# Patient Record
Sex: MALE | Race: BLACK OR AFRICAN AMERICAN | Employment: FULL TIME | URBAN - METROPOLITAN AREA
[De-identification: names, ages, dates, MRNs, and addresses within clinical notes are randomized per-mention and may not be internally consistent; named-entity substitution may affect disease eponyms.]

---

## 2020-11-02 ENCOUNTER — HOSPITAL ENCOUNTER (EMERGENCY)
Age: 35
Discharge: HOME | End: 2020-11-02
Attending: EMERGENCY MEDICINE
Payer: COMMERCIAL

## 2020-11-02 VITALS
SYSTOLIC BLOOD PRESSURE: 151 MMHG | BODY MASS INDEX: 28.04 KG/M2 | DIASTOLIC BLOOD PRESSURE: 102 MMHG | HEIGHT: 68 IN | OXYGEN SATURATION: 97 % | TEMPERATURE: 97.1 F | HEART RATE: 88 BPM | WEIGHT: 185 LBS | RESPIRATION RATE: 18 BRPM

## 2020-11-02 PROCEDURE — 99283 EMERGENCY DEPT VISIT LOW MDM: CPT

## 2020-11-02 RX ORDER — ALBUTEROL SULFATE 90 UG/1
2 AEROSOL, METERED RESPIRATORY (INHALATION) EVERY 6 HOURS PRN
Qty: 1 INHALER | Refills: 0 | Status: SHIPPED | OUTPATIENT
Start: 2020-11-02 | End: 2020-11-09

## 2020-11-02 RX ORDER — PREDNISONE 50 MG/1
50 TABLET ORAL DAILY
Qty: 5 TABLET | Refills: 0 | Status: ON HOLD | OUTPATIENT
Start: 2020-11-02 | End: 2020-11-10 | Stop reason: HOSPADM

## 2020-11-02 ASSESSMENT — ENCOUNTER SYMPTOMS
NAUSEA: 0
SINUS PRESSURE: 0
SORE THROAT: 0
CHEST TIGHTNESS: 0
BACK PAIN: 0
DIARRHEA: 0
VOMITING: 0
ABDOMINAL PAIN: 0
RHINORRHEA: 1
COUGH: 1
SHORTNESS OF BREATH: 0
WHEEZING: 0

## 2020-11-02 ASSESSMENT — PAIN DESCRIPTION - FREQUENCY: FREQUENCY: CONTINUOUS

## 2020-11-02 ASSESSMENT — PAIN DESCRIPTION - LOCATION: LOCATION: GENERALIZED

## 2020-11-02 ASSESSMENT — PAIN DESCRIPTION - PAIN TYPE: TYPE: ACUTE PAIN

## 2020-11-02 ASSESSMENT — PAIN DESCRIPTION - DESCRIPTORS: DESCRIPTORS: ACHING

## 2020-11-02 NOTE — ED PROVIDER NOTES
Chief complaint:  Covid    HPI history provided by the patient  Patient comes in complaining of Covid positive. He has had symptoms for about 5 days including upper respiratory symptoms with cough and congestion with body aches, fevers, chills and sweats. Loss of appetite but no vomiting or diarrhea. States he is not eating as much but still drinking well and staying hydrated. Get some headaches with it. No stiff neck. No confusion. No abdominal pain or flank pain. No rashes. No difficulty breathing. Has tried some intermittent NyQuil with mild improvement. Nothing really makes them better otherwise, he does feel fatigued with exertion which makes him worse. Review of Systems   Constitutional: Positive for activity change, appetite change, chills, diaphoresis, fatigue and fever. HENT: Positive for congestion and rhinorrhea. Negative for ear pain, sinus pressure and sore throat. Respiratory: Positive for cough. Negative for chest tightness, shortness of breath and wheezing. Cardiovascular: Negative for chest pain and palpitations. Gastrointestinal: Negative for abdominal pain, diarrhea, nausea and vomiting. Genitourinary: Negative for dysuria, flank pain, frequency, genital sores and urgency. Musculoskeletal: Positive for arthralgias and myalgias. Negative for back pain, gait problem, joint swelling, neck pain and neck stiffness. Skin: Negative for rash and wound. Neurological: Positive for headaches. Negative for dizziness, seizures, syncope, weakness, light-headedness and numbness. Hematological: Negative for adenopathy. All other systems reviewed and are negative. Physical Exam  Vitals signs and nursing note reviewed. Constitutional:       General: He is not in acute distress. Appearance: He is well-developed. He is diaphoretic. He is not ill-appearing or toxic-appearing. Comments: Patient sitting up in the bed smiling, laughing and talking pleasantly.   He is mildly diaphoretic. He otherwise appears well. HENT:      Head: Normocephalic and atraumatic. Right Ear: Tympanic membrane, ear canal and external ear normal.      Left Ear: Tympanic membrane, ear canal and external ear normal.      Nose: Mucosal edema and congestion present. No rhinorrhea. Mouth/Throat:      Pharynx: Oropharynx is clear. Uvula midline. No pharyngeal swelling, oropharyngeal exudate, posterior oropharyngeal erythema or uvula swelling. Tonsils: No tonsillar exudate or tonsillar abscesses. Eyes:      Pupils: Pupils are equal, round, and reactive to light. Neck:      Musculoskeletal: Normal range of motion and neck supple. Normal range of motion. No neck rigidity, injury, pain with movement, spinous process tenderness or muscular tenderness. Trachea: Trachea and phonation normal. No tracheal tenderness or tracheal deviation. Comments: No adenopathy or meningeal signs  Cardiovascular:      Rate and Rhythm: Normal rate and regular rhythm. Heart sounds: Normal heart sounds. No murmur. Pulmonary:      Effort: Pulmonary effort is normal. No respiratory distress. Breath sounds: Normal breath sounds. No stridor, decreased air movement or transmitted upper airway sounds. No decreased breath sounds, wheezing, rhonchi or rales. Chest:      Chest wall: No tenderness. Abdominal:      General: Bowel sounds are normal. There is no distension. Palpations: Abdomen is soft. Tenderness: There is no abdominal tenderness. There is no right CVA tenderness, left CVA tenderness, guarding or rebound. Musculoskeletal:         General: No swelling, tenderness, deformity or signs of injury. Right lower leg: No edema. Left lower leg: No edema. Comments: No pretibial edema or calf pain   Lymphadenopathy:      Cervical: No cervical adenopathy. Skin:     General: Skin is warm. Coloration: Skin is not jaundiced or pale.       Findings: No erythema or rash.   Neurological:      General: No focal deficit present. Mental Status: He is alert and oriented to person, place, and time. GCS: GCS eye subscore is 4. GCS verbal subscore is 5. GCS motor subscore is 6. Cranial Nerves: Cranial nerves are intact. No cranial nerve deficit. Sensory: Sensation is intact. Motor: Motor function is intact. Coordination: Coordination is intact. Coordination normal.   Psychiatric:         Behavior: Behavior is cooperative. Procedures     MDM              --------------------------------------------- PAST HISTORY ---------------------------------------------  Past Medical History:  has no past medical history on file. Past Surgical History:  has no past surgical history on file. Social History:      Family History: family history is not on file. The patients home medications have been reviewed. Allergies: Patient has no known allergies. -------------------------------------------------- RESULTS -------------------------------------------------  Labs:  No results found for this visit on 11/02/20. Radiology:  No orders to display       ------------------------- NURSING NOTES AND VITALS REVIEWED ---------------------------  Date / Time Roomed:  11/2/2020  8:02 AM  ED Bed Assignment:  07/07    The nursing notes within the ED encounter and vital signs as below have been reviewed. BP (!) 151/102   Pulse 88   Temp 97.1 °F (36.2 °C) (Temporal)   Resp 18   Ht 5' 8\" (1.727 m)   Wt 185 lb (83.9 kg)   SpO2 97%   BMI 28.13 kg/m²   Oxygen Saturation Interpretation: Normal      ------------------------------------------ PROGRESS NOTES ------------------------------------------  I have spoken with the patient and discussed todays results, in addition to providing specific details for the plan of care and counseling regarding the diagnosis and prognosis. Their questions are answered at this time and they are agreeable with the plan.  I discussed at length with them reasons for immediate return here for re evaluation. They will followup with primary care by calling their office tomorrow. --------------------------------- ADDITIONAL PROVIDER NOTES ---------------------------------  At this time the patient is without objective evidence of an acute process requiring hospitalization or inpatient management. They have remained hemodynamically stable throughout their entire ED visit and are stable for discharge with outpatient follow-up. The plan has been discussed in detail and they are aware of the specific conditions for emergent return, as well as the importance of follow-up. New Prescriptions    ALBUTEROL SULFATE HFA (PROAIR HFA) 108 (90 BASE) MCG/ACT INHALER    Inhale 2 puffs into the lungs every 6 hours as needed for Wheezing    PREDNISONE (DELTASONE) 50 MG TABLET    Take 1 tablet by mouth daily for 5 days       Diagnosis:  1. COVID-19        Disposition:  Patient's disposition: Discharge to home  Patient's condition is stable.               Lobo Garvin DO  11/02/20 8332

## 2020-11-03 ENCOUNTER — CARE COORDINATION (OUTPATIENT)
Dept: CASE MANAGEMENT | Age: 35
End: 2020-11-03

## 2020-11-03 NOTE — CARE COORDINATION
Covid-19 Initial Outreach call, no answer.   Left VM with contact information and request  for return call at [address] [facility], [address] [facility] Coordination Transition

## 2020-11-04 ENCOUNTER — CARE COORDINATION (OUTPATIENT)
Dept: CASE MANAGEMENT | Age: 35
End: 2020-11-04

## 2020-11-04 NOTE — CARE COORDINATION
Covid-19 2nd Outreach call, no answer.    H number no longer valid,  Left VM on cell with contact information and request  for return call at [address] [facility], [address] [place] [address] Coordination Transition

## 2020-11-05 ENCOUNTER — HOSPITAL ENCOUNTER (INPATIENT)
Age: 35
LOS: 5 days | Discharge: HOME | DRG: 871 | End: 2020-11-10
Attending: EMERGENCY MEDICINE | Admitting: INTERNAL MEDICINE
Payer: COMMERCIAL

## 2020-11-05 ENCOUNTER — APPOINTMENT (OUTPATIENT)
Dept: CT IMAGING | Age: 35
DRG: 871 | End: 2020-11-05
Payer: COMMERCIAL

## 2020-11-05 PROBLEM — R04.2 HEMOPTYSIS: Status: ACTIVE | Noted: 2020-11-05

## 2020-11-05 PROBLEM — R06.89 DYSPNEA AND RESPIRATORY ABNORMALITIES: Status: ACTIVE | Noted: 2020-11-05

## 2020-11-05 PROBLEM — U07.1 COVID-19 VIRUS INFECTION: Status: ACTIVE | Noted: 2020-11-05

## 2020-11-05 PROBLEM — R06.00 DYSPNEA AND RESPIRATORY ABNORMALITIES: Status: ACTIVE | Noted: 2020-11-05

## 2020-11-05 PROBLEM — U07.1 COVID-19: Status: ACTIVE | Noted: 2020-11-05

## 2020-11-05 LAB
ALBUMIN SERPL-MCNC: 3.9 G/DL (ref 3.5–5.2)
ALP BLD-CCNC: 56 U/L (ref 40–129)
ALT SERPL-CCNC: 62 U/L (ref 0–40)
AMYLASE: 103 U/L (ref 20–100)
APTT: 25.9 SEC (ref 24.5–35.1)
AST SERPL-CCNC: 34 U/L (ref 0–39)
BILIRUB SERPL-MCNC: 0.7 MG/DL (ref 0–1.2)
BILIRUBIN DIRECT: 0.3 MG/DL (ref 0–0.3)
BILIRUBIN, INDIRECT: 0.4 MG/DL (ref 0–1)
CK MB: <1 NG/ML (ref 0–7.7)
D DIMER: 475 NG/ML DDU
HCT VFR BLD CALC: 40.7 % (ref 37–54)
HEMOGLOBIN: 14 G/DL (ref 12.5–16.5)
INR BLD: 1.3
LACTIC ACID: 1.4 MMOL/L (ref 0.5–2.2)
LIPASE: 48 U/L (ref 13–60)
MAGNESIUM: 2.4 MG/DL (ref 1.6–2.6)
MCH RBC QN AUTO: 28.8 PG (ref 26–35)
MCHC RBC AUTO-ENTMCNC: 34.4 % (ref 32–34.5)
MCV RBC AUTO: 83.7 FL (ref 80–99.9)
PDW BLD-RTO: 12.2 FL (ref 11.5–15)
PLATELET # BLD: 256 E9/L (ref 130–450)
PMV BLD AUTO: 9.8 FL (ref 7–12)
PRO-BNP: <5 PG/ML (ref 0–125)
PROTHROMBIN TIME: 14.9 SEC (ref 9.3–12.4)
RBC # BLD: 4.86 E12/L (ref 3.8–5.8)
TOTAL CK: 67 U/L (ref 20–200)
TOTAL PROTEIN: 8.9 G/DL (ref 6.4–8.3)
WBC # BLD: 6.5 E9/L (ref 4.5–11.5)

## 2020-11-05 PROCEDURE — 80048 BASIC METABOLIC PNL TOTAL CA: CPT

## 2020-11-05 PROCEDURE — 6370000000 HC RX 637 (ALT 250 FOR IP): Performed by: EMERGENCY MEDICINE

## 2020-11-05 PROCEDURE — 83735 ASSAY OF MAGNESIUM: CPT

## 2020-11-05 PROCEDURE — 83690 ASSAY OF LIPASE: CPT

## 2020-11-05 PROCEDURE — 71275 CT ANGIOGRAPHY CHEST: CPT

## 2020-11-05 PROCEDURE — 83880 ASSAY OF NATRIURETIC PEPTIDE: CPT

## 2020-11-05 PROCEDURE — 80076 HEPATIC FUNCTION PANEL: CPT

## 2020-11-05 PROCEDURE — 85610 PROTHROMBIN TIME: CPT

## 2020-11-05 PROCEDURE — 6360000004 HC RX CONTRAST MEDICATION: Performed by: RADIOLOGY

## 2020-11-05 PROCEDURE — 36415 COLL VENOUS BLD VENIPUNCTURE: CPT

## 2020-11-05 PROCEDURE — 1200000000 HC SEMI PRIVATE

## 2020-11-05 PROCEDURE — 96374 THER/PROPH/DIAG INJ IV PUSH: CPT

## 2020-11-05 PROCEDURE — 85730 THROMBOPLASTIN TIME PARTIAL: CPT

## 2020-11-05 PROCEDURE — 87040 BLOOD CULTURE FOR BACTERIA: CPT

## 2020-11-05 PROCEDURE — 85027 COMPLETE CBC AUTOMATED: CPT

## 2020-11-05 PROCEDURE — 93005 ELECTROCARDIOGRAM TRACING: CPT | Performed by: EMERGENCY MEDICINE

## 2020-11-05 PROCEDURE — 87205 SMEAR GRAM STAIN: CPT

## 2020-11-05 PROCEDURE — 83605 ASSAY OF LACTIC ACID: CPT

## 2020-11-05 PROCEDURE — 82550 ASSAY OF CK (CPK): CPT

## 2020-11-05 PROCEDURE — 82553 CREATINE MB FRACTION: CPT

## 2020-11-05 PROCEDURE — 2580000003 HC RX 258: Performed by: EMERGENCY MEDICINE

## 2020-11-05 PROCEDURE — 87070 CULTURE OTHR SPECIMN AEROBIC: CPT

## 2020-11-05 PROCEDURE — 85378 FIBRIN DEGRADE SEMIQUANT: CPT

## 2020-11-05 PROCEDURE — 82150 ASSAY OF AMYLASE: CPT

## 2020-11-05 PROCEDURE — 99283 EMERGENCY DEPT VISIT LOW MDM: CPT

## 2020-11-05 PROCEDURE — 6360000002 HC RX W HCPCS: Performed by: EMERGENCY MEDICINE

## 2020-11-05 RX ORDER — ALBUTEROL SULFATE 90 UG/1
1 AEROSOL, METERED RESPIRATORY (INHALATION) ONCE
Status: COMPLETED | OUTPATIENT
Start: 2020-11-05 | End: 2020-11-05

## 2020-11-05 RX ORDER — ALBUTEROL SULFATE 90 UG/1
2 AEROSOL, METERED RESPIRATORY (INHALATION) 4 TIMES DAILY
Status: DISCONTINUED | OUTPATIENT
Start: 2020-11-05 | End: 2020-11-05

## 2020-11-05 RX ORDER — 0.9 % SODIUM CHLORIDE 0.9 %
1000 INTRAVENOUS SOLUTION INTRAVENOUS ONCE
Status: COMPLETED | OUTPATIENT
Start: 2020-11-05 | End: 2020-11-05

## 2020-11-05 RX ORDER — IPRATROPIUM BROMIDE AND ALBUTEROL SULFATE 2.5; .5 MG/3ML; MG/3ML
1 SOLUTION RESPIRATORY (INHALATION) ONCE
Status: DISCONTINUED | OUTPATIENT
Start: 2020-11-05 | End: 2020-11-05

## 2020-11-05 RX ADMIN — SODIUM CHLORIDE 1000 ML: 9 INJECTION, SOLUTION INTRAVENOUS at 21:46

## 2020-11-05 RX ADMIN — ALBUTEROL SULFATE 1 PUFF: 90 AEROSOL, METERED RESPIRATORY (INHALATION) at 23:01

## 2020-11-05 RX ADMIN — IOPAMIDOL 75 ML: 755 INJECTION, SOLUTION INTRAVENOUS at 21:31

## 2020-11-05 RX ADMIN — CEFEPIME HYDROCHLORIDE 2 G: 2 INJECTION, POWDER, FOR SOLUTION INTRAVENOUS at 23:17

## 2020-11-05 RX ADMIN — AZITHROMYCIN 500 MG: 500 INJECTION, POWDER, LYOPHILIZED, FOR SOLUTION INTRAVENOUS at 21:46

## 2020-11-05 ASSESSMENT — ENCOUNTER SYMPTOMS
EYE PAIN: 0
COUGH: 1
SORE THROAT: 0
VOMITING: 0
SHORTNESS OF BREATH: 1
CHEST TIGHTNESS: 1
DIARRHEA: 0
ABDOMINAL PAIN: 0
EYE REDNESS: 0
NAUSEA: 0
SINUS PRESSURE: 0
EYE DISCHARGE: 0
WHEEZING: 0
BACK PAIN: 0

## 2020-11-06 LAB
ABO/RH: NORMAL
ADENOVIRUS BY PCR: NOT DETECTED
ALBUMIN SERPL-MCNC: 3.8 G/DL (ref 3.5–5.2)
ALP BLD-CCNC: 44 U/L (ref 40–129)
ALT SERPL-CCNC: 45 U/L (ref 0–40)
ANION GAP SERPL CALCULATED.3IONS-SCNC: 16 MMOL/L (ref 7–16)
ANION GAP SERPL CALCULATED.3IONS-SCNC: 9 MMOL/L (ref 7–16)
ANTIBODY SCREEN: NORMAL
APTT: 26.3 SEC (ref 24.5–35.1)
APTT: 28.3 SEC (ref 24.5–35.1)
AST SERPL-CCNC: 23 U/L (ref 0–39)
BASOPHILS ABSOLUTE: 0 E9/L (ref 0–0.2)
BASOPHILS RELATIVE PERCENT: 0.2 % (ref 0–2)
BILIRUB SERPL-MCNC: 0.5 MG/DL (ref 0–1.2)
BORDETELLA PARAPERTUSSIS BY PCR: NOT DETECTED
BORDETELLA PERTUSSIS BY PCR: NOT DETECTED
BUN BLDV-MCNC: 14 MG/DL (ref 6–20)
BUN BLDV-MCNC: 16 MG/DL (ref 6–20)
C-REACTIVE PROTEIN: 7 MG/DL (ref 0–0.4)
CALCIUM SERPL-MCNC: 8.6 MG/DL (ref 8.6–10.2)
CALCIUM SERPL-MCNC: 9.1 MG/DL (ref 8.6–10.2)
CHLAMYDOPHILIA PNEUMONIAE BY PCR: NOT DETECTED
CHLORIDE BLD-SCNC: 103 MMOL/L (ref 98–107)
CHLORIDE BLD-SCNC: 97 MMOL/L (ref 98–107)
CO2: 25 MMOL/L (ref 22–29)
CO2: 27 MMOL/L (ref 22–29)
CORONAVIRUS 229E BY PCR: NOT DETECTED
CORONAVIRUS HKU1 BY PCR: NOT DETECTED
CORONAVIRUS NL63 BY PCR: NOT DETECTED
CORONAVIRUS OC43 BY PCR: NOT DETECTED
CREAT SERPL-MCNC: 1.1 MG/DL (ref 0.7–1.2)
CREAT SERPL-MCNC: 1.3 MG/DL (ref 0.7–1.2)
D DIMER: 424 NG/ML DDU
EKG ATRIAL RATE: 100 BPM
EKG P AXIS: 31 DEGREES
EKG P-R INTERVAL: 128 MS
EKG Q-T INTERVAL: 340 MS
EKG QRS DURATION: 80 MS
EKG QTC CALCULATION (BAZETT): 438 MS
EKG R AXIS: 107 DEGREES
EKG T AXIS: 25 DEGREES
EKG VENTRICULAR RATE: 100 BPM
EOSINOPHILS ABSOLUTE: 0 E9/L (ref 0.05–0.5)
EOSINOPHILS RELATIVE PERCENT: 0 % (ref 0–6)
FERRITIN: 709 NG/ML
FIBRINOGEN: >700 MG/DL (ref 225–540)
FIBRINOGEN: >700 MG/DL (ref 225–540)
GFR AFRICAN AMERICAN: >60
GFR AFRICAN AMERICAN: >60
GFR NON-AFRICAN AMERICAN: >60 ML/MIN/1.73
GFR NON-AFRICAN AMERICAN: >60 ML/MIN/1.73
GLUCOSE BLD-MCNC: 137 MG/DL (ref 74–99)
GLUCOSE BLD-MCNC: 173 MG/DL (ref 74–99)
HBA1C MFR BLD: 5.6 % (ref 4–5.6)
HCT VFR BLD CALC: 43 % (ref 37–54)
HEMOGLOBIN: 14.5 G/DL (ref 12.5–16.5)
HUMAN METAPNEUMOVIRUS BY PCR: NOT DETECTED
HUMAN RHINOVIRUS/ENTEROVIRUS BY PCR: NOT DETECTED
INFLUENZA A BY PCR: NOT DETECTED
INFLUENZA B BY PCR: NOT DETECTED
L. PNEUMOPHILA SEROGP 1 UR AG: NORMAL
LACTATE DEHYDROGENASE: 249 U/L (ref 135–225)
LYMPHOCYTES ABSOLUTE: 0.37 E9/L (ref 1.5–4)
LYMPHOCYTES RELATIVE PERCENT: 6.1 % (ref 20–42)
MAGNESIUM: 2.5 MG/DL (ref 1.6–2.6)
MCH RBC QN AUTO: 28.9 PG (ref 26–35)
MCHC RBC AUTO-ENTMCNC: 33.7 % (ref 32–34.5)
MCV RBC AUTO: 85.7 FL (ref 80–99.9)
MONOCYTES ABSOLUTE: 0.12 E9/L (ref 0.1–0.95)
MONOCYTES RELATIVE PERCENT: 1.7 % (ref 2–12)
MYCOPLASMA PNEUMONIAE BY PCR: NOT DETECTED
NEUTROPHILS ABSOLUTE: 5.7 E9/L (ref 1.8–7.3)
NEUTROPHILS RELATIVE PERCENT: 92.2 % (ref 43–80)
PARAINFLUENZA VIRUS 1 BY PCR: NOT DETECTED
PARAINFLUENZA VIRUS 2 BY PCR: NOT DETECTED
PARAINFLUENZA VIRUS 3 BY PCR: NOT DETECTED
PARAINFLUENZA VIRUS 4 BY PCR: NOT DETECTED
PDW BLD-RTO: 12.1 FL (ref 11.5–15)
PHOSPHORUS: 1.9 MG/DL (ref 2.5–4.5)
PLATELET # BLD: 282 E9/L (ref 130–450)
PMV BLD AUTO: 9.9 FL (ref 7–12)
POTASSIUM SERPL-SCNC: 3.6 MMOL/L (ref 3.5–5)
POTASSIUM SERPL-SCNC: 3.9 MMOL/L (ref 3.5–5)
PROCALCITONIN: 0.07 NG/ML (ref 0–0.08)
RBC # BLD: 5.02 E12/L (ref 3.8–5.8)
RESPIRATORY SYNCYTIAL VIRUS BY PCR: NOT DETECTED
SARS-COV-2, PCR: DETECTED
SODIUM BLD-SCNC: 138 MMOL/L (ref 132–146)
SODIUM BLD-SCNC: 139 MMOL/L (ref 132–146)
STREP PNEUMONIAE ANTIGEN, URINE: NORMAL
TOTAL PROTEIN: 7.6 G/DL (ref 6.4–8.3)
TSH SERPL DL<=0.05 MIU/L-ACNC: 0.28 UIU/ML (ref 0.27–4.2)
WBC # BLD: 6.2 E9/L (ref 4.5–11.5)

## 2020-11-06 PROCEDURE — 86900 BLOOD TYPING SEROLOGIC ABO: CPT

## 2020-11-06 PROCEDURE — 85025 COMPLETE CBC W/AUTO DIFF WBC: CPT

## 2020-11-06 PROCEDURE — 82728 ASSAY OF FERRITIN: CPT

## 2020-11-06 PROCEDURE — 87449 NOS EACH ORGANISM AG IA: CPT

## 2020-11-06 PROCEDURE — 83735 ASSAY OF MAGNESIUM: CPT

## 2020-11-06 PROCEDURE — 94640 AIRWAY INHALATION TREATMENT: CPT

## 2020-11-06 PROCEDURE — 6370000000 HC RX 637 (ALT 250 FOR IP): Performed by: INTERNAL MEDICINE

## 2020-11-06 PROCEDURE — 85378 FIBRIN DEGRADE SEMIQUANT: CPT

## 2020-11-06 PROCEDURE — 2060000000 HC ICU INTERMEDIATE R&B

## 2020-11-06 PROCEDURE — 84145 PROCALCITONIN (PCT): CPT

## 2020-11-06 PROCEDURE — 85730 THROMBOPLASTIN TIME PARTIAL: CPT

## 2020-11-06 PROCEDURE — 85384 FIBRINOGEN ACTIVITY: CPT

## 2020-11-06 PROCEDURE — 86850 RBC ANTIBODY SCREEN: CPT

## 2020-11-06 PROCEDURE — 87450 HC DIRECT STREP B ANTIGEN: CPT

## 2020-11-06 PROCEDURE — 6360000002 HC RX W HCPCS: Performed by: INTERNAL MEDICINE

## 2020-11-06 PROCEDURE — 86901 BLOOD TYPING SEROLOGIC RH(D): CPT

## 2020-11-06 PROCEDURE — 83615 LACTATE (LD) (LDH) ENZYME: CPT

## 2020-11-06 PROCEDURE — 83036 HEMOGLOBIN GLYCOSYLATED A1C: CPT

## 2020-11-06 PROCEDURE — 2580000003 HC RX 258: Performed by: INTERNAL MEDICINE

## 2020-11-06 PROCEDURE — 2500000003 HC RX 250 WO HCPCS: Performed by: INTERNAL MEDICINE

## 2020-11-06 PROCEDURE — 94664 DEMO&/EVAL PT USE INHALER: CPT

## 2020-11-06 PROCEDURE — 36415 COLL VENOUS BLD VENIPUNCTURE: CPT

## 2020-11-06 PROCEDURE — 84443 ASSAY THYROID STIM HORMONE: CPT

## 2020-11-06 PROCEDURE — 84100 ASSAY OF PHOSPHORUS: CPT

## 2020-11-06 PROCEDURE — 86140 C-REACTIVE PROTEIN: CPT

## 2020-11-06 PROCEDURE — 0202U NFCT DS 22 TRGT SARS-COV-2: CPT

## 2020-11-06 PROCEDURE — 80053 COMPREHEN METABOLIC PANEL: CPT

## 2020-11-06 RX ORDER — ACETAMINOPHEN 325 MG/1
650 TABLET ORAL EVERY 6 HOURS PRN
Status: DISCONTINUED | OUTPATIENT
Start: 2020-11-06 | End: 2020-11-10 | Stop reason: HOSPADM

## 2020-11-06 RX ORDER — SODIUM CHLORIDE 0.9 % (FLUSH) 0.9 %
10 SYRINGE (ML) INJECTION EVERY 12 HOURS SCHEDULED
Status: DISCONTINUED | OUTPATIENT
Start: 2020-11-06 | End: 2020-11-10 | Stop reason: HOSPADM

## 2020-11-06 RX ORDER — ZINC SULFATE 50(220)MG
50 CAPSULE ORAL DAILY
Status: DISCONTINUED | OUTPATIENT
Start: 2020-11-06 | End: 2020-11-10 | Stop reason: HOSPADM

## 2020-11-06 RX ORDER — PROMETHAZINE HYDROCHLORIDE AND CODEINE PHOSPHATE 6.25; 1 MG/5ML; MG/5ML
5 SOLUTION ORAL EVERY 4 HOURS PRN
Status: DISCONTINUED | OUTPATIENT
Start: 2020-11-06 | End: 2020-11-10 | Stop reason: HOSPADM

## 2020-11-06 RX ORDER — ONDANSETRON 2 MG/ML
4 INJECTION INTRAMUSCULAR; INTRAVENOUS EVERY 6 HOURS PRN
Status: DISCONTINUED | OUTPATIENT
Start: 2020-11-06 | End: 2020-11-10 | Stop reason: HOSPADM

## 2020-11-06 RX ORDER — POLYETHYLENE GLYCOL 3350 17 G/17G
17 POWDER, FOR SOLUTION ORAL DAILY PRN
Status: DISCONTINUED | OUTPATIENT
Start: 2020-11-06 | End: 2020-11-10 | Stop reason: HOSPADM

## 2020-11-06 RX ORDER — SODIUM CHLORIDE 0.9 % (FLUSH) 0.9 %
10 SYRINGE (ML) INJECTION PRN
Status: DISCONTINUED | OUTPATIENT
Start: 2020-11-06 | End: 2020-11-10 | Stop reason: HOSPADM

## 2020-11-06 RX ORDER — ACETAMINOPHEN 650 MG/1
650 SUPPOSITORY RECTAL EVERY 6 HOURS PRN
Status: DISCONTINUED | OUTPATIENT
Start: 2020-11-06 | End: 2020-11-10 | Stop reason: HOSPADM

## 2020-11-06 RX ORDER — ASCORBIC ACID 500 MG
1000 TABLET ORAL DAILY
Status: DISCONTINUED | OUTPATIENT
Start: 2020-11-06 | End: 2020-11-10 | Stop reason: HOSPADM

## 2020-11-06 RX ORDER — DEXAMETHASONE SODIUM PHOSPHATE 10 MG/ML
6 INJECTION INTRAMUSCULAR; INTRAVENOUS ONCE
Status: COMPLETED | OUTPATIENT
Start: 2020-11-06 | End: 2020-11-06

## 2020-11-06 RX ORDER — DEXAMETHASONE 6 MG/1
6 TABLET ORAL DAILY
Status: DISCONTINUED | OUTPATIENT
Start: 2020-11-06 | End: 2020-11-10 | Stop reason: HOSPADM

## 2020-11-06 RX ORDER — DEXTROSE AND SODIUM CHLORIDE 5; .9 G/100ML; G/100ML
INJECTION, SOLUTION INTRAVENOUS CONTINUOUS
Status: DISCONTINUED | OUTPATIENT
Start: 2020-11-06 | End: 2020-11-09

## 2020-11-06 RX ORDER — 0.9 % SODIUM CHLORIDE 0.9 %
30 INTRAVENOUS SOLUTION INTRAVENOUS PRN
Status: DISCONTINUED | OUTPATIENT
Start: 2020-11-06 | End: 2020-11-10 | Stop reason: HOSPADM

## 2020-11-06 RX ORDER — SODIUM CHLORIDE 9 MG/ML
INJECTION, SOLUTION INTRAVENOUS EVERY 12 HOURS
Status: DISCONTINUED | OUTPATIENT
Start: 2020-11-06 | End: 2020-11-10 | Stop reason: HOSPADM

## 2020-11-06 RX ADMIN — DEXAMETHASONE SODIUM PHOSPHATE 6 MG: 10 INJECTION INTRAMUSCULAR; INTRAVENOUS at 01:20

## 2020-11-06 RX ADMIN — SODIUM CHLORIDE: 9 INJECTION, SOLUTION INTRAVENOUS at 15:30

## 2020-11-06 RX ADMIN — OXYCODONE HYDROCHLORIDE AND ACETAMINOPHEN 1000 MG: 500 TABLET ORAL at 09:40

## 2020-11-06 RX ADMIN — ZINC SULFATE 220 MG (50 MG) CAPSULE 50 MG: CAPSULE at 09:40

## 2020-11-06 RX ADMIN — AZITHROMYCIN DIHYDRATE 250 MG: 500 INJECTION, POWDER, LYOPHILIZED, FOR SOLUTION INTRAVENOUS at 22:19

## 2020-11-06 RX ADMIN — ENOXAPARIN SODIUM 30 MG: 30 INJECTION SUBCUTANEOUS at 00:47

## 2020-11-06 RX ADMIN — IPRATROPIUM BROMIDE AND ALBUTEROL 1 PUFF: 20; 100 SPRAY, METERED RESPIRATORY (INHALATION) at 18:45

## 2020-11-06 RX ADMIN — ENOXAPARIN SODIUM 30 MG: 30 INJECTION SUBCUTANEOUS at 09:40

## 2020-11-06 RX ADMIN — ENOXAPARIN SODIUM 30 MG: 30 INJECTION SUBCUTANEOUS at 22:16

## 2020-11-06 RX ADMIN — Medication 10 ML: at 09:43

## 2020-11-06 RX ADMIN — Medication 5 ML: at 22:18

## 2020-11-06 RX ADMIN — Medication 10 ML: at 22:16

## 2020-11-06 RX ADMIN — IPRATROPIUM BROMIDE AND ALBUTEROL 1 PUFF: 20; 100 SPRAY, METERED RESPIRATORY (INHALATION) at 09:34

## 2020-11-06 RX ADMIN — DEXTROSE AND SODIUM CHLORIDE: 5; 900 INJECTION, SOLUTION INTRAVENOUS at 00:23

## 2020-11-06 RX ADMIN — REMDESIVIR 200 MG: 100 INJECTION, POWDER, LYOPHILIZED, FOR SOLUTION INTRAVENOUS at 17:45

## 2020-11-06 RX ADMIN — CEFEPIME 2 G: 2 INJECTION, POWDER, FOR SOLUTION INTRAVENOUS at 12:22

## 2020-11-06 RX ADMIN — Medication 5 ML: at 18:04

## 2020-11-06 RX ADMIN — DEXAMETHASONE 6 MG: 6 TABLET ORAL at 12:17

## 2020-11-06 ASSESSMENT — PAIN SCALES - GENERAL
PAINLEVEL_OUTOF10: 0

## 2020-11-06 NOTE — CARE COORDINATION
11/6/2020 Positive Covid (11/4/2020) and pending 11/5/2020. CM transition of care:  Pt in isolation- reached pt by cell to complete ACP documentation for advance care planning. Pt lives alone in a single story home. He works for [employer] a state of [employer]. He is independent and his fiance or his mother- legal nok will provide a ride a discharge. CM/SS to follow.  Electronically signed by Arlene Rodriguez RN-BC on 11/6/2020 at 10:14 AM

## 2020-11-06 NOTE — CONSULTS
Infectious Disease Consult Note     Admit Date: 11/5/2020  8:52 PM    Chief complaint: SOB and hemoptysis    Reason for Consult:  COVID19 with hemoptysis     Requesting Physician:  Parminder Elizalde DO      HISTORY OF PRESENT ILLNESS:    This is a 38year old male with history of COVID19 diagnosed on 10/26 who presented to ER with SOB and hemoptysis. CTA showed bilateral pneumonia but no PE. Patient is on room air with no further hemoptysis. Id consulted for recommendations. REVIEW OF SYSTEMS:    Negative except as above     MEDICAL HISTORY  History reviewed. No pertinent past medical history. There is no immunization history on file for this patient. History reviewed. No pertinent surgical history. FAMILY HISTORY  family history is not on file.   SOCIAL HISTORY  Social History     Socioeconomic History    Marital status:      Spouse name: Not on file    Number of children: Not on file    Years of education: Not on file    Highest education level: Not on file   Occupational History    Not on file   Social Needs    Financial resource strain: Not on file    Food insecurity     Worry: Not on file     Inability: Not on file    Transportation needs     Medical: Not on file     Non-medical: Not on file   Tobacco Use    Smoking status: Never Smoker    Smokeless tobacco: Never Used   Substance and Sexual Activity    Alcohol use: Not Currently    Drug use: Never    Sexual activity: Not on file   Lifestyle    Physical activity     Days per week: Not on file     Minutes per session: Not on file    Stress: Not on file   Relationships    Social connections     Talks on phone: Not on file     Gets together: Not on file     Attends [employer] service: Not on file     Active member of club or organization: Not on file     Attends meetings of clubs or organizations: Not on file     Relationship status: Not on file    Intimate partner violence     Fear of current or ex partner: Not on file Emotionally abused: Not on file     Physically abused: Not on file     Forced sexual activity: Not on file   Other Topics Concern    Not on file   Social History Narrative    Not on file         Current Medications:     Scheduled Meds:   sodium chloride flush  10 mL Intravenous 2 times per day    enoxaparin  30 mg Subcutaneous BID    cefepime  2 g Intravenous Q12H    azithromycin  250 mg Intravenous Q24H    zinc sulfate  50 mg Oral Daily    vitamin C  1,000 mg Oral Daily    albuterol-ipratropium  1 puff Inhalation Q6H    dexamethasone  6 mg Oral Daily     Continuous Infusions:   dextrose 5 % and 0.9 % NaCl 100 mL/hr at 11/06/20 0023    sodium chloride       PRN Meds:sodium chloride flush, acetaminophen **OR** acetaminophen, polyethylene glycol, ondansetron      PHYSICAL EXAM:  Vitals:    11/06/20 1100 11/06/20 1200 11/06/20 1300 11/06/20 1400   BP: (!) 147/94 (!) 160/11     Pulse: 94 92 89 88   Resp: 25 17 25 30   Temp:       TempSrc:       SpO2: 91% 92% 91% 91%   Weight:       Height:           General Appearance:       Alert, cooperative, no distress, appears stated age        Heent:    Normocephalic, atraumatic,     PERRL, conjunctiva/corneas clear     no drainage or sinus tenderness      Neck:   Supple, symmetrical, trachea midline   Back:     Symmetric, no CVA tenderness   Lungs:     Clear to auscultation bilaterally, respirations unlabored   Chest Wall:    No tenderness or deformity    Heart:    Regular rate and rhythm, S1 and S2 normal, no murmur, rub or   gallop   Abdomen:     Soft, non-tender, bowel sounds active all four quadrants         Extremities:   Extremities normal, atraumatic, no cyanosis or edema   Pulses:   LE extremities   Skin:   Skin color, texture, turgor normal, no rashes or lesions   Lymph nodes:   Cervical, supraclavicular, and axillary nodes normal   Neurologic:   CNII-XII intact, no focal deficits  ROOM AIR        LABS AND DATA REVIEW:     Recent Labs     11/05/20  2250 11/06/20  0551   WBC 6.5 6.2   HGB 14.0 14.5   HCT 40.7 43.0   MCV 83.7 85.7    282     Recent Labs     11/05/20 2123 11/06/20  0551    139   K 3.6 3.9   CL 97* 103   CO2 25 27   BUN 16 14   CREATININE 1.3* 1.1   GFRAA >60 >60   LABGLOM >60 >60   GLUCOSE 137* 173*   PROT 8.9* 7.6   LABALBU 3.9 3.8   CALCIUM 9.1 8.6   BILITOT 0.7 0.5   ALKPHOS 56 44   AST 34 23   ALT 62* 45*       BLOOD CX #1  No results for input(s): BC in the last 72 hours. BLOOD CX #2  No results for input(s): Wickes Holes in the last 72 hours. WOUND culture  No results for input(s): WNDABS in the last 72 hours. URINE CULTURE  No results for input(s): LABURIN in the last 72 hours. ASSESSMENT  This is a 38year old male with history of COVID19 diagnosed on 10/26 who presented to ER with SOB and hemoptysis. CTA showed bilateral pneumonia but no PE. Patient is on room air with no further hemoptysis. Id consulted for recommendations. PLAN  Will start on Remdesivir due to elevated fibrinogen and second hospitalization in less than 2 weeks   Will continue on Azithromycin and Cefepime   Continue Decadron and vitamins   Cycle biomakers  Clinically he is doing well on room air with mild SOB   All labs, cultures, imaging reviewed     Thank you for consult. JONATHAN Babcock NP  11/6/2020  3:44 PM     I have discussed the case, including pertinent history and physical  exam findings . I have seen and examined the patient and the key elements of the encounter have been performed by me. I agree with the assessment, plan and orders as documented.       Treatment plan as per my recommendation     Ekaterina Katz MD, FACP  11/6/2020  9:39 PM

## 2020-11-06 NOTE — ACP (ADVANCE CARE PLANNING)
Advance Care Planning     Advance Care Planning Activator (Inpatient)  Conversation Note      Date of ACP Conversation: 11/5/2020    Conversation Conducted with: Patient with Decision Making Capacity    ACP Activator: [address] Decision Maker:     Current Designated Health Care Decision Maker:   Primary Decision Maker: Sheryle Grade - Parent - [phone]     Care Preferences    Ventilation: \"If you were in your present state of health and suddenly became very ill and were unable to breathe on your own, what would your preference be about the use of a ventilator (breathing machine) if it were available to you? \"      Would the patient desire the use of ventilator (breathing machine)?: yes    \"If your health worsens and it becomes clear that your chance of recovery is unlikely, what would your preference be about the use of a ventilator (breathing machine) if it were available to you? \"     Would the patient desire the use of ventilator (breathing machine)?: Yes      Resuscitation  \"CPR works best to restart the heart when there is a sudden event, like a heart attack, in someone who is otherwise healthy. Unfortunately, CPR does not typically restart the heart for people who have serious health conditions or who are very sick. \"    \"In the event your heart stopped as a result of an underlying serious health condition, would you want attempts to be made to restart your heart yes     [x] Yes   [] No   Educated Patient / Decision Maker regarding differences between Advance Directives and portable DNR orders.     Length of ACP Conversation in minutes:      Conversation Outcomes:  [x] ACP discussion completed  [] Existing advance directive reviewed with patient; no changes to patient's previously recorded wishes  [] New Advance Directive completed  [] Portable Do Not Rescitate prepared for Provider review and signature  [] POLST/POST/MOLST/MOST prepared for Provider review and signature      Follow-up plan: [] Schedule follow-up conversation to continue planning  [] Referred individual to Provider for additional questions/concerns   [] Advised patient/agent/surrogate to review completed ACP document and update if needed with changes in condition, patient preferences or care setting    [x] This note routed to one or more involved healthcare providers    Electronically signed by Rebbeca Dubin, RN-BC on 11/6/2020 at 10:04 AM

## 2020-11-06 NOTE — ED PROVIDER NOTES
Diagnosed with Covid on October 26. Now coughing up blood today. Never smoke. Pleuritic chest pain fever no vomiting no diarrhea no abdominal pain no leg swelling no calf never smoke positive sob    The history is provided by the patient. Review of Systems   Constitutional: Negative for chills and fever. HENT: Negative for ear pain, sinus pressure and sore throat. Eyes: Negative for pain, discharge and redness. Respiratory: Positive for cough, chest tightness and shortness of breath. Negative for wheezing. Hemoptysis   Cardiovascular: Negative for chest pain. Gastrointestinal: Negative for abdominal pain, diarrhea, nausea and vomiting. Genitourinary: Negative for dysuria and frequency. Musculoskeletal: Negative for arthralgias and back pain. Skin: Negative for rash and wound. Neurological: Negative for weakness and headaches. Hematological: Negative for adenopathy. All other systems reviewed and are negative. Physical Exam  Vitals signs and nursing note reviewed. Constitutional:       Appearance: He is well-developed. HENT:      Head: Normocephalic and atraumatic. Eyes:      Conjunctiva/sclera: Conjunctivae normal.   Neck:      Musculoskeletal: Normal range of motion and neck supple. Cardiovascular:      Rate and Rhythm: Normal rate and regular rhythm. Heart sounds: Normal heart sounds. No murmur. Pulmonary:      Effort: Pulmonary effort is normal. No respiratory distress. Breath sounds: Normal breath sounds. No wheezing or rales. Abdominal:      General: Bowel sounds are normal.      Palpations: Abdomen is soft. Tenderness: There is no abdominal tenderness. There is no guarding or rebound. Musculoskeletal:         General: No tenderness or deformity. Skin:     General: Skin is warm and dry. Neurological:      Mental Status: He is alert and oriented to person, place, and time. Cranial Nerves: No cranial nerve deficit. Coordination: Coordination normal.          Procedures     [facility]         --------------------------------------------- PAST HISTORY ---------------------------------------------  Past Medical History:  has no past medical history on file. Past Surgical History:  has no past surgical history on file. Social History:  reports that he has never smoked. He has never used smokeless tobacco. He reports previous alcohol use. He reports that he does not use drugs. Family History: family history is not on file. The patients home medications have been reviewed. Allergies: Patient has no known allergies.     -------------------------------------------------- RESULTS -------------------------------------------------    LABS:  Results for orders placed or performed during the hospital encounter of 11/05/20   CK MB   Result Value Ref Range    CK-MB <1.0 0.0 - 7.7 ng/mL   CK   Result Value Ref Range    Total CK 67 20 - 200 U/L   D-Dimer, Quantitative   Result Value Ref Range    D-Dimer, Quant 475 ng/mL DDU   Hepatic Function Panel   Result Value Ref Range    Total Protein 8.9 (H) 6.4 - 8.3 g/dL    Alb 3.9 3.5 - 5.2 g/dL    Alkaline Phosphatase 56 40 - 129 U/L    ALT 62 (H) 0 - 40 U/L    AST 34 0 - 39 U/L    Total Bilirubin 0.7 0.0 - 1.2 mg/dL    Bilirubin, Direct 0.3 0.0 - 0.3 mg/dL    Bilirubin, Indirect 0.4 0.0 - 1.0 mg/dL   Lipase   Result Value Ref Range    Lipase 48 13 - 60 U/L   Amylase   Result Value Ref Range    Amylase 103 (H) 20 - 100 U/L   Magnesium   Result Value Ref Range    Magnesium 2.4 1.6 - 2.6 mg/dL   Brain Natriuretic Peptide   Result Value Ref Range    Pro-BNP <5 0 - 125 pg/mL   Protime-INR   Result Value Ref Range    Protime 14.9 (H) 9.3 - 12.4 sec    INR 1.3    APTT   Result Value Ref Range    aPTT 25.9 24.5 - 35.1 sec   Lactic Acid, Plasma   Result Value Ref Range    Lactic Acid 1.4 0.5 - 2.2 mmol/L   EKG 12 Lead   Result Value Ref Range    Ventricular Rate 100 BPM    Atrial Rate 100 BPM    P-R Interval 128 ms    QRS Duration 80 ms    Q-T Interval 340 ms    QTc Calculation (Bazett) 438 ms    P Axis 31 degrees    R Axis 107 degrees    T Axis 25 degrees       RADIOLOGY:  CTA PULMONARY W CONTRAST   Final Result   1. No pulmonary embolism. 2.  Patchy airspace and interstitial opacities bilaterally related to   bilateral pneumonia. 3.  Perihilar lymphadenopathy notable on the right. EKG:  This EKG is signed and interpreted by me. Rate: 100  Rhythm: Sinus  Interpretation: no acute changes  Comparison: no previous EKG available      ------------------------- NURSING NOTES AND VITALS REVIEWED ---------------------------   The nursing notes within the ED encounter and vital signs as below have been reviewed. BP (!) 157/104   Pulse 110   Temp 100.2 °F (37.9 °C) (Oral)   Resp 17   Ht 5' 8\" (1.727 m)   Wt 185 lb (83.9 kg)   SpO2 93%   BMI 28.13 kg/m²   Oxygen Saturation Interpretation: Abnormal      ------------------------------------------ PROGRESS NOTES ------------------------------------------     Consultations:      Counseling:   I have spoken with the patient and discussed todays results, in addition to providing specific details for the plan of care and counseling regarding the diagnosis and prognosis. Their questions are answered at this time and they are agreeable with the plan.      --------------------------------- ADDITIONAL PROVIDER NOTES ---------------------------------         This patient's ED course included: a personal history and physicial examination, re-evaluation prior to disposition, multiple bedside re-evaluations, IV medications, cardiac monitoring, continuous pulse oximetry and complex medical decision making and emergency management    This patient has remained hemodynamically stable during their ED course.     Critical Care: 35         --------------------------------- IMPRESSION AND DISPOSITION ---------------------------------    IMPRESSION  1. Hemoptysis New Problem   2. COVID-19    3.  Dyspnea and respiratory abnormalities        DISPOSITION  Disposition: Admit to telemetry  Patient condition is stable              Felicity Robledo MD  11/06/20 2773

## 2020-11-06 NOTE — PROGRESS NOTES
Internal Medicine Progress Note    [facility]    Giuliana Holt., TREVONODENIA Sanders D.O., F.A.C.O.I. Carlus Hammans, D.O. Carin Speaks, MSN, APRN, NP-C  Tyrell Roe. Nathalie Mike, MSN, APRN-CNP     Primary Care Physician: Zenon Galarza MD   Admitting Physician:  Gage Gardner DO  Admission date and time: 11/5/2020  8:52 PM    Room:  [place] [address]  Admitting diagnosis: COVID-19 virus infection [U07.1]  COVID-19 virus infection [U07.1]    Patient Name: Nikki Lang  MRN: [id number]    Date of Service: 11/6/2020     Subjective:  Leonidas South is a 38 y.o. male who was seen and examined today,11/6/2020, at the bedside. Nurse to relate that the patient is doing well. No further hemoptysis at the present time. O2 saturation is good resting comfortably    No family present during my examination. F/u COVID-19    To prevent transmission of COVID-19 and also the need to preserve PPE,  a HPI/ROS/PE with the patient was not performed. Pt was seen in icu   Pts chart was reviewed including laboratory results and  imaging. Care will be coordinated with the ID/icu physician/nurse. Physical Exam:  No intake/output data recorded. Intake/Output Summary (Last 24 hours) at 11/6/2020 1353  Last data filed at 11/6/2020 0626  Gross per 24 hour   Intake --   Output 490 ml   Net -490 ml   I/O last 3 completed shifts:  In: -   Out: 490 [Urine:490]  Patient Vitals for the past 96 hrs (Last 3 readings):   Weight   11/06/20 0026 174 lb (78.9 kg)   11/05/20 2101 185 lb (83.9 kg)     Vital Signs:   Blood pressure (!) 160/11, pulse 92, temperature 97 °F (36.1 °C), temperature source Infrared, resp. rate 17, height 5' 8\" (1.727 m), weight 174 lb (78.9 kg), SpO2 92 %.     Medication:  Scheduled Meds:   sodium chloride flush  10 mL Intravenous 2 times per day    enoxaparin  30 mg Subcutaneous BID    cefepime  2 g Intravenous Q12H    azithromycin  250 mg Intravenous Q24H    zinc sulfate  50 mg Oral Daily    vitamin C  1,000 mg Oral Daily    albuterol-ipratropium  1 puff Inhalation Q6H    dexamethasone  6 mg Oral Daily     Continuous Infusions:   dextrose 5 % and 0.9 % NaCl 100 mL/hr at 11/06/20 0023    sodium chloride         Objective Data:  CBC with Differential:    Lab Results   Component Value Date    WBC 6.2 11/06/2020    RBC 5.02 11/06/2020    HGB 14.5 11/06/2020    HCT 43.0 11/06/2020     11/06/2020    MCV 85.7 11/06/2020    MCH 28.9 11/06/2020    MCHC 33.7 11/06/2020    RDW 12.1 11/06/2020    LYMPHOPCT 6.1 11/06/2020    MONOPCT 1.7 11/06/2020    BASOPCT 0.2 11/06/2020    MONOSABS 0.12 11/06/2020    LYMPHSABS 0.37 11/06/2020    EOSABS 0.00 11/06/2020    BASOSABS 0.00 11/06/2020     CMP:    Lab Results   Component Value Date     11/06/2020    K 3.9 11/06/2020     11/06/2020    CO2 27 11/06/2020    BUN 14 11/06/2020    CREATININE 1.1 11/06/2020    GFRAA >60 11/06/2020    LABGLOM >60 11/06/2020    GLUCOSE 173 11/06/2020    PROT 7.6 11/06/2020    LABALBU 3.8 11/06/2020    CALCIUM 8.6 11/06/2020    BILITOT 0.5 11/06/2020    ALKPHOS 44 11/06/2020    AST 23 11/06/2020    ALT 45 11/06/2020              Assessment:    · COVID-19 pneumonia  · Hemoptysis probably secondary to coughing      Plan:     · Nursing relates that the patient continued to do well. · Continue treatment for the COVID-19 infection. Monitor inflammatory markers. ID consult  · Encourage proning  · IV steroids  · Appears stable to transfer to [facility]      35 minutes of critical care time was spent with the patient. This includes chart review, , and discussion with those consultants involved in the patient's care. More than 50% of my  time was spent at the bedside counseling/coordinating care with the patient and/or family with face to face contact. This time was spent reviewing notes and laboratory data as well as instructing and counseling the patient.  Time I

## 2020-11-06 NOTE — PLAN OF CARE
Problem: Falls - Risk of:  Goal: Will remain free from falls  Description: Will remain free from falls  Outcome: Met This Shift     Problem: Falls - Risk of:  Goal: Absence of physical injury  Description: Absence of physical injury  Outcome: Met This Shift     Problem: Isolation Precautions - Risk of Spread of Infection  Goal: Prevent transmission of infection  Outcome: Met This Shift     Problem: Risk for Fluid Volume Deficit  Goal: Maintain normal heart rhythm  Outcome: Met This Shift     Problem: Risk for Fluid Volume Deficit  Goal: Maintain absence of muscle cramping  Outcome: Met This Shift     Problem: Risk for Fluid Volume Deficit  Goal: Maintain normal serum potassium, sodium, calcium, phosphorus, and pH  Outcome: Met This Shift     Problem: Nutrition Deficits  Goal: Optimize nutrtional status  Outcome: Not Met This Shift

## 2020-11-06 NOTE — CONSULTS
Pulmonary/Critical Care Consult Note    CHIEF COMPLAINT: hemoptysis    ISSUES:    COVID-19 virus infection associated pneumonia  Hemoptysis on the basis of above (low risk otherwise given on-smoker and CT findings)  Expect to clear with resolution of infection  Favor more aggressive COVID treatment based upon repeat hospitalization despite Antibiotics / Steroids    If hemoptysis persists after recovery, would consider bronchoscopy    Reviewed with pt  Discussed with ID and primary team        __________________________________________________    Referring MD: Dr. Sundeep Felix    Reason for Consult: COVID 19 PNA      HISTORY OF PRESENT ILLNESS:   Debbi Crowe is a 38 y.o. male gerneally well,  presents with 2 weeks worth of GI sx, eveolving into cough, SOB, fever and episodes of hemoptysis, dark blood up to 1/4 cup per day max. Losing weight over this time  Came to hospital, started on Azithro / pred and sent home. Comes back with worsening sx. No drugs, smoking, vaping, ETOH    ALLERGY:  Patient has no known allergies. FAMILY HISTORY:  History reviewed. No pertinent family history.     SOCIAL HISTORY:  Social History     Socioeconomic History    Marital status:      Spouse name: Not on file    Number of children: Not on file    Years of education: Not on file    Highest education level: Not on file   Occupational History    Not on file   Social Needs    Financial resource strain: Not on file    Food insecurity     Worry: Not on file     Inability: Not on file    Transportation needs     Medical: Not on file     Non-medical: Not on file   Tobacco Use    Smoking status: Never Smoker    Smokeless tobacco: Never Used   Substance and Sexual Activity    Alcohol use: Not Currently    Drug use: Never    Sexual activity: Not on file   Lifestyle    Physical activity     Days per week: Not on file     Minutes per session: Not on file    Stress: Not on file   Relationships    Social connections Talks on phone: Not on file     Gets together: Not on file     Attends [employer] service: Not on file     Active member of club or organization: Not on file     Attends meetings of clubs or organizations: Not on file     Relationship status: Not on file    Intimate partner violence     Fear of current or ex partner: Not on file     Emotionally abused: Not on file     Physically abused: Not on file     Forced sexual activity: Not on file   Other Topics Concern    Not on file   Social History Narrative    Not on file       MEDICAL HISTORY:  History reviewed. No pertinent past medical history. MEDICATIONS:   sodium chloride flush  10 mL Intravenous 2 times per day    enoxaparin  30 mg Subcutaneous BID    cefepime  2 g Intravenous Q12H    azithromycin  250 mg Intravenous Q24H    zinc sulfate  50 mg Oral Daily    vitamin C  1,000 mg Oral Daily    albuterol-ipratropium  1 puff Inhalation Q6H      dextrose 5 % and 0.9 % NaCl 100 mL/hr at 11/06/20 0023    sodium chloride       sodium chloride flush, acetaminophen **OR** acetaminophen, polyethylene glycol, ondansetron    REVIEW OF SYSTEMS:  Constituttional: positive F/C/S  Skin: Denies pigmentation, dark lesions, and rashes   HEENT: Denies hearing loss, tinnitus, ear drainage, epistaxis, sore throat, and hoarseness. Cardiovascular: Denies palpitations,  positive chest pain,.   Gastrointestinal: positive nausea, vomiting, poor appetite, diarrhea, heartburn   Genitourinary: Denies dysuria, frequency, urgency or hematuria  Musculoskeletal: Denies myalgias, muscle weakness, and bone pain  Neurological: Denies dizziness, vertigo, headache, and focal weakness  Psychological: Denies anxiety and depression  Endocrine: Denies heat intolerance and cold intolerance  Hematopoietic/Lymphatic: Denies bleeding problems and blood transfusions    PHYSICAL EXAM:  Vitals:    11/06/20 0700   BP: 132/73   Pulse: 79   Resp: 14   Temp:    SpO2: 92%           O2 Device: None (Room air)    General Appearance: alert and oriented to person, place and time, in no acute distress  Cardiovascular: normal rate, regular rhythm, normal S1 and S2, no murmurs, rubs, clicks, or gallops, distal pulses intact, no carotid bruits, no JVD  Pulmonary/Chest: clear to auscultation bilaterally- no wheezes, rales or rhonchi, normal air movement, no respiratory distress  Abdomen: soft, non-tender, non-distended, normal bowel sounds, no masses   Extremities: no cyanosis, clubbing or edema, pulse   Skin: warm and dry, no rash or erythema  Head: normocephalic and atraumatic  Eyes: pupils equal, round, and reactive to light  Neck: supple and non-tender without mass, no thyromegaly   Musculoskeletal: normal range of motion, no joint swelling, deformity or tenderness  Neurological: alert, oriented, normal speech, no focal findings or movement disorder noted    LABS:  WBC   Date Value Ref Range Status   11/06/2020 6.2 4.5 - 11.5 E9/L Final   11/05/2020 6.5 4.5 - 11.5 E9/L Final     Hemoglobin   Date Value Ref Range Status   11/06/2020 14.5 12.5 - 16.5 g/dL Final   11/05/2020 14.0 12.5 - 16.5 g/dL Final     Hematocrit   Date Value Ref Range Status   11/06/2020 43.0 37.0 - 54.0 % Final   11/05/2020 40.7 37.0 - 54.0 % Final     MCV   Date Value Ref Range Status   11/06/2020 85.7 80.0 - 99.9 fL Final   11/05/2020 83.7 80.0 - 99.9 fL Final     Platelets   Date Value Ref Range Status   11/06/2020 282 130 - 450 E9/L Final   11/05/2020 256 130 - 450 E9/L Final     Sodium   Date Value Ref Range Status   11/06/2020 139 132 - 146 mmol/L Final   11/05/2020 138 132 - 146 mmol/L Final     Potassium   Date Value Ref Range Status   11/06/2020 3.9 3.5 - 5.0 mmol/L Final   11/05/2020 3.6 3.5 - 5.0 mmol/L Final     Chloride   Date Value Ref Range Status   11/06/2020 103 98 - 107 mmol/L Final   11/05/2020 97 (L) 98 - 107 mmol/L Final     CO2   Date Value Ref Range Status   11/06/2020 27 22 - 29 mmol/L Final   11/05/2020 25 22 - 29 mmol/L Final     BUN   Date Value Ref Range Status   11/06/2020 14 6 - 20 mg/dL Final   11/05/2020 16 6 - 20 mg/dL Final     CREATININE   Date Value Ref Range Status   11/06/2020 1.1 0.7 - 1.2 mg/dL Final   11/05/2020 1.3 (H) 0.7 - 1.2 mg/dL Final     Glucose   Date Value Ref Range Status   11/06/2020 173 (H) 74 - 99 mg/dL Final   11/05/2020 137 (H) 74 - 99 mg/dL Final     Calcium   Date Value Ref Range Status   11/06/2020 8.6 8.6 - 10.2 mg/dL Final   11/05/2020 9.1 8.6 - 10.2 mg/dL Final     Total Protein   Date Value Ref Range Status   11/06/2020 7.6 6.4 - 8.3 g/dL Final   11/05/2020 8.9 (H) 6.4 - 8.3 g/dL Final     Alb   Date Value Ref Range Status   11/06/2020 3.8 3.5 - 5.2 g/dL Final   11/05/2020 3.9 3.5 - 5.2 g/dL Final     Total Bilirubin   Date Value Ref Range Status   11/06/2020 0.5 0.0 - 1.2 mg/dL Final   11/05/2020 0.7 0.0 - 1.2 mg/dL Final     Alkaline Phosphatase   Date Value Ref Range Status   11/06/2020 44 40 - 129 U/L Final   11/05/2020 56 40 - 129 U/L Final     AST   Date Value Ref Range Status   11/06/2020 23 0 - 39 U/L Final   11/05/2020 34 0 - 39 U/L Final     ALT   Date Value Ref Range Status   11/06/2020 45 (H) 0 - 40 U/L Final   11/05/2020 62 (H) 0 - 40 U/L Final     GFR Non-   Date Value Ref Range Status   11/06/2020 >60 >=60 mL/min/1.73 Final     Comment:     Chronic Kidney Disease: less than 60 ml/min/1.73 sq.m. Kidney Failure: less than 15 ml/min/1.73 sq.m. Results valid for patients 18 years and older. 11/05/2020 >60 >=60 mL/min/1.73 Final     Comment:     Chronic Kidney Disease: less than 60 ml/min/1.73 sq.m. Kidney Failure: less than 15 ml/min/1.73 sq.m. Results valid for patients 18 years and older.        GFR    Date Value Ref Range Status   11/06/2020 >60  Final   11/05/2020 >60  Final     Magnesium   Date Value Ref Range Status   11/06/2020 2.5 1.6 - 2.6 mg/dL Final   11/05/2020 2.4 1.6 - 2.6 mg/dL Final     Phosphorus   Date Value Ref Range Status   11/06/2020 1.9 (L) 2.5 - 4.5 mg/dL Final     No results for input(s): PH, PO2, PCO2, HCO3, BE, O2SAT in the last 72 hours. RADIOLOGY:  CTA PULMONARY W CONTRAST   Final Result   1. No pulmonary embolism. 2.  Patchy airspace and interstitial opacities bilaterally related to   bilateral pneumonia. 3.  Perihilar lymphadenopathy notable on the right.            (Independently reviewed by me)      Stephany Bailey M.D  Pulmonary & Critical Care  11/6/2020 9:09 AM

## 2020-11-06 NOTE — ED NOTES
Name: Bryan Becerra  : 1985  MRN: [id number]    Date: 2020    Benefits of immediately proceeding with Radiology exam outweigh the risks and therefore the following is being waived:      [] Pregnancy test    [] Protocol for Iodine allergy    [] MRI questionnaire    [x] BUN/Creatinine        MD Kristan Avery MD  [phone]

## 2020-11-06 NOTE — H&P
status: Never Smoker    Smokeless tobacco: Never Used   Substance and Sexual Activity    Alcohol use: Not Currently    Drug use: Never    Sexual activity: Not on file   Lifestyle    Physical activity     Days per week: Not on file     Minutes per session: Not on file    Stress: Not on file   Relationships    Social connections     Talks on phone: Not on file     Gets together: Not on file     Attends Confucianism service: Not on file     Active member of club or organization: Not on file     Attends meetings of clubs or organizations: Not on file     Relationship status: Not on file    Intimate partner violence     Fear of current or ex partner: Not on file     Emotionally abused: Not on file     Physically abused: Not on file     Forced sexual activity: Not on file   Other Topics Concern    Not on file   Social History Narrative    Not on file       ROS: Positive in bold  General:   Denies chills, fatigue, fever, malaise, night sweats or weight loss    Psychological:   Denies anxiety, disorientation or hallucinations    ENT:    Denies epistaxis, headaches, vertigo or visual changes    Cardiovascular:   Denies any chest pain, irregular heartbeats, or palpitations. No paroxysmal nocturnal dyspnea. Respiratory:   Denies shortness of breath, coughing, sputum production, hemoptysis, or wheezing. No orthopnea. Gastrointestinal:   Denies nausea, vomiting, diarrhea, or constipation. Denies any abdominal pain. Denies change in bowel habits or stools. Genito-Urinary:    Denies any urgency, frequency, hematuria. Voiding without difficulty. Musculoskeletal:   Denies joint pain, joint stiffness, joint swelling or muscle pain    Neurology:    Denies any headache or focal neurological deficits. No weakness or paresthesia. Derm:    Denies any rashes, ulcers, or excoriations. Denies bruising. Extremities:   Denies any lower extremity swelling or edema.       PHYSICAL EXAM:  VITALS:  Vitals: 11/05/20 2101   BP: (!) 157/104   Pulse: 110   Resp: 17   Temp: 100.2 °F (37.9 °C)   SpO2: 93%         CONSTITUTIONAL:    Awake, alert, cooperative, no apparent distress, and appears stated age    EYES:    PERRL, EOMI, sclera clear, conjunctiva normal    ENT:    Normocephalic, atraumatic, sinuses nontender on palpation. External ears without lesions. Oral pharynx with moist mucus membranes. Tonsils without erythema or exudates. NECK:    Supple, symmetrical, trachea midline, no adenopathy, thyroid symmetric, not enlarged and no tenderness, skin normal, no bruits, no JVD    HEMATOLOGIC/LYMPHATICS:    No cervical lymphadenopathy and no supraclavicular lymphadenopathy    LUNGS:    Diminished breath sounds bilaterally    CARDIOVASCULAR:    Normal apical impulse, regular rate and rhythm, normal S1 and S2, no S3 or S4, and no murmur noted    ABDOMEN:    No scars, normal bowel sounds, soft, non-distended, non-tender, no masses palpated, no hepatosplenomegaly, no rebound or guarding elicited on palpation     MUSCULOSKELETAL:    There is no redness, warmth, or swelling of the joints. Full range of motion noted. Motor strength is 5 out of 5 all extremities bilaterally. Tone is normal.    NEUROLOGIC:    Awake, alert, oriented to name, place and time. Cranial nerves II-XII are grossly intact. Motor is 5 out of 5 bilaterally. SKIN:    No bruising or bleeding. No redness, warmth, or swelling    EXTREMITIES:    Peripheral pulses present. No edema, cyanosis, or swelling. OSTEOPATHIC:    Examined in seated and supine positions. Normal thoracic kyphosis and lumbar lordosis. No acute somatic dysfunction.     LABORATORY DATA:  CBC with Differential:    Lab Results   Component Value Date    WBC 6.5 11/05/2020    RBC 4.86 11/05/2020    HGB 14.0 11/05/2020    HCT 40.7 11/05/2020     11/05/2020    MCV 83.7 11/05/2020    MCH 28.8 11/05/2020    MCHC 34.4 11/05/2020    RDW 12.2 11/05/2020     CMP:    Lab Results Component Value Date    PROT 8.9 11/05/2020    LABALBU 3.9 11/05/2020    BILITOT 0.7 11/05/2020    ALKPHOS 56 11/05/2020    AST 34 11/05/2020    ALT 62 11/05/2020       ASSESSMENT/PLAN:  1. COVID-19 pneumonia  1. Persistent bilateral pneumonia. However no PE on CTA of chest.  Will obtain inflammatory markers and cultures. Continue broad-spectrum antibiotics. Placed on D5 normal saline. Treat nausea. Give dose of Decadron. Id consulted. See orders for the plan of care. Carmenza Tam D.O.  10:31 PM  11/5/2020    Electronically signed by Carmenza Tam DO on 11/5/20 at 10:31 PM EST    Telehealth visit completed via physician liaison, Dr. Magaly Gao,  who personally saw and examined the patient. I personally participated in the case including review of pertinent history as augmented in the above medical record and medical decision making on the date of service and I agree with all pertinent clinical formation unless otherwise noted. Taylor Burgos D.O., F.A.C.O.I.  11:00 PM  11/5/2020

## 2020-11-07 LAB
ALBUMIN SERPL-MCNC: 3.3 G/DL (ref 3.5–5.2)
ALP BLD-CCNC: 39 U/L (ref 40–129)
ALT SERPL-CCNC: 36 U/L (ref 0–40)
ANION GAP SERPL CALCULATED.3IONS-SCNC: 9 MMOL/L (ref 7–16)
APTT: 26.2 SEC (ref 24.5–35.1)
AST SERPL-CCNC: 17 U/L (ref 0–39)
BASOPHILS ABSOLUTE: 0 E9/L (ref 0–0.2)
BASOPHILS RELATIVE PERCENT: 0 % (ref 0–2)
BILIRUB SERPL-MCNC: 0.4 MG/DL (ref 0–1.2)
BUN BLDV-MCNC: 16 MG/DL (ref 6–20)
CALCIUM SERPL-MCNC: 8.5 MG/DL (ref 8.6–10.2)
CHLORIDE BLD-SCNC: 104 MMOL/L (ref 98–107)
CO2: 24 MMOL/L (ref 22–29)
CREAT SERPL-MCNC: 0.9 MG/DL (ref 0.7–1.2)
D DIMER: 565 NG/ML DDU
EOSINOPHILS ABSOLUTE: 0 E9/L (ref 0.05–0.5)
EOSINOPHILS RELATIVE PERCENT: 0 % (ref 0–6)
FIBRINOGEN: >700 MG/DL (ref 225–540)
GFR AFRICAN AMERICAN: >60
GFR NON-AFRICAN AMERICAN: >60 ML/MIN/1.73
GLUCOSE BLD-MCNC: 150 MG/DL (ref 74–99)
HCT VFR BLD CALC: 40.3 % (ref 37–54)
HEMOGLOBIN: 14.2 G/DL (ref 12.5–16.5)
LYMPHOCYTES ABSOLUTE: 0.7 E9/L (ref 1.5–4)
LYMPHOCYTES RELATIVE PERCENT: 10 % (ref 20–42)
MCH RBC QN AUTO: 29.3 PG (ref 26–35)
MCHC RBC AUTO-ENTMCNC: 35.2 % (ref 32–34.5)
MCV RBC AUTO: 83.1 FL (ref 80–99.9)
MONOCYTES ABSOLUTE: 0.42 E9/L (ref 0.1–0.95)
MONOCYTES RELATIVE PERCENT: 6 % (ref 2–12)
NEUTROPHILS ABSOLUTE: 5.88 E9/L (ref 1.8–7.3)
NEUTROPHILS RELATIVE PERCENT: 84 % (ref 43–80)
PDW BLD-RTO: 12 FL (ref 11.5–15)
PLATELET # BLD: 324 E9/L (ref 130–450)
PMV BLD AUTO: 9.5 FL (ref 7–12)
POTASSIUM SERPL-SCNC: 3.7 MMOL/L (ref 3.5–5)
RBC # BLD: 4.85 E12/L (ref 3.8–5.8)
SODIUM BLD-SCNC: 137 MMOL/L (ref 132–146)
TOTAL PROTEIN: 7 G/DL (ref 6.4–8.3)
WBC # BLD: 7 E9/L (ref 4.5–11.5)

## 2020-11-07 PROCEDURE — 85025 COMPLETE CBC W/AUTO DIFF WBC: CPT

## 2020-11-07 PROCEDURE — 36415 COLL VENOUS BLD VENIPUNCTURE: CPT

## 2020-11-07 PROCEDURE — XW033E5 INTRODUCTION OF REMDESIVIR ANTI-INFECTIVE INTO PERIPHERAL VEIN, PERCUTANEOUS APPROACH, NEW TECHNOLOGY GROUP 5: ICD-10-PCS | Performed by: INTERNAL MEDICINE

## 2020-11-07 PROCEDURE — 85384 FIBRINOGEN ACTIVITY: CPT

## 2020-11-07 PROCEDURE — 83520 IMMUNOASSAY QUANT NOS NONAB: CPT

## 2020-11-07 PROCEDURE — 2580000003 HC RX 258: Performed by: INTERNAL MEDICINE

## 2020-11-07 PROCEDURE — 2500000003 HC RX 250 WO HCPCS: Performed by: INTERNAL MEDICINE

## 2020-11-07 PROCEDURE — 6370000000 HC RX 637 (ALT 250 FOR IP): Performed by: INTERNAL MEDICINE

## 2020-11-07 PROCEDURE — 85378 FIBRIN DEGRADE SEMIQUANT: CPT

## 2020-11-07 PROCEDURE — 85730 THROMBOPLASTIN TIME PARTIAL: CPT

## 2020-11-07 PROCEDURE — 6360000002 HC RX W HCPCS: Performed by: INTERNAL MEDICINE

## 2020-11-07 PROCEDURE — 2060000000 HC ICU INTERMEDIATE R&B

## 2020-11-07 PROCEDURE — 94640 AIRWAY INHALATION TREATMENT: CPT

## 2020-11-07 PROCEDURE — 80053 COMPREHEN METABOLIC PANEL: CPT

## 2020-11-07 RX ADMIN — OXYCODONE HYDROCHLORIDE AND ACETAMINOPHEN 1000 MG: 500 TABLET ORAL at 08:17

## 2020-11-07 RX ADMIN — CEFEPIME 2 G: 2 INJECTION, POWDER, FOR SOLUTION INTRAVENOUS at 12:59

## 2020-11-07 RX ADMIN — Medication 5 ML: at 05:04

## 2020-11-07 RX ADMIN — IPRATROPIUM BROMIDE AND ALBUTEROL 1 PUFF: 20; 100 SPRAY, METERED RESPIRATORY (INHALATION) at 01:26

## 2020-11-07 RX ADMIN — CEFEPIME 2 G: 2 INJECTION, POWDER, FOR SOLUTION INTRAVENOUS at 00:20

## 2020-11-07 RX ADMIN — SODIUM CHLORIDE: 9 INJECTION, SOLUTION INTRAVENOUS at 15:30

## 2020-11-07 RX ADMIN — DEXTROSE AND SODIUM CHLORIDE: 5; 900 INJECTION, SOLUTION INTRAVENOUS at 05:06

## 2020-11-07 RX ADMIN — Medication 10 ML: at 08:18

## 2020-11-07 RX ADMIN — AZITHROMYCIN DIHYDRATE 250 MG: 500 INJECTION, POWDER, LYOPHILIZED, FOR SOLUTION INTRAVENOUS at 23:17

## 2020-11-07 RX ADMIN — Medication 5 ML: at 12:59

## 2020-11-07 RX ADMIN — IPRATROPIUM BROMIDE AND ALBUTEROL 1 PUFF: 20; 100 SPRAY, METERED RESPIRATORY (INHALATION) at 13:30

## 2020-11-07 RX ADMIN — DEXAMETHASONE 6 MG: 6 TABLET ORAL at 08:18

## 2020-11-07 RX ADMIN — ENOXAPARIN SODIUM 30 MG: 30 INJECTION SUBCUTANEOUS at 21:14

## 2020-11-07 RX ADMIN — IPRATROPIUM BROMIDE AND ALBUTEROL 1 PUFF: 20; 100 SPRAY, METERED RESPIRATORY (INHALATION) at 21:29

## 2020-11-07 RX ADMIN — DEXTROSE AND SODIUM CHLORIDE: 5; 900 INJECTION, SOLUTION INTRAVENOUS at 17:24

## 2020-11-07 RX ADMIN — SODIUM CHLORIDE: 9 INJECTION, SOLUTION INTRAVENOUS at 04:40

## 2020-11-07 RX ADMIN — REMDESIVIR 100 MG: 100 INJECTION, POWDER, LYOPHILIZED, FOR SOLUTION INTRAVENOUS at 17:17

## 2020-11-07 RX ADMIN — ZINC SULFATE 220 MG (50 MG) CAPSULE 50 MG: CAPSULE at 08:17

## 2020-11-07 RX ADMIN — IPRATROPIUM BROMIDE AND ALBUTEROL 1 PUFF: 20; 100 SPRAY, METERED RESPIRATORY (INHALATION) at 05:13

## 2020-11-07 RX ADMIN — Medication 10 ML: at 23:38

## 2020-11-07 RX ADMIN — ENOXAPARIN SODIUM 30 MG: 30 INJECTION SUBCUTANEOUS at 08:17

## 2020-11-07 RX ADMIN — Medication 5 ML: at 17:27

## 2020-11-07 ASSESSMENT — PAIN SCALES - GENERAL
PAINLEVEL_OUTOF10: 0

## 2020-11-07 NOTE — PROGRESS NOTES
Pulmonary/Critical Care Progress Note  ISSUES:    COVID-19 virus infection associated pneumonia  Hemoptysis on the basis of above (low risk otherwise given on-smoker and CT findings)  Expect to clear with resolution of infection  Favor more aggressive COVID treatment based upon repeat hospitalization despite Antibiotics / Steroids    If hemoptysis persists after recovery, would consider bronchoscopy      Started on Remdesivir  Decadron, Vit  No O2 need  Improved - less hemoptysis      Discussed with ID team        __________________________________________________      HISTORY OF PRESENT ILLNESS:   Jonathon Elias is a 38 y.o. male gerneally well,  presents with 2 weeks worth of GI sx, eveolving into cough, SOB, fever and episodes of hemoptysis, dark blood up to 1/4 cup per day max. Losing weight over this time  Came to hospital, started on Azithro / pred and sent home. Comes back with worsening sx. No drugs, smoking, vaping, ETOH      MEDICAL HISTORY:  History reviewed. No pertinent past medical history.     MEDICATIONS:   sodium chloride flush  10 mL Intravenous 2 times per day    enoxaparin  30 mg Subcutaneous BID    cefepime  2 g Intravenous Q12H    azithromycin  250 mg Intravenous Q24H    zinc sulfate  50 mg Oral Daily    vitamin C  1,000 mg Oral Daily    albuterol-ipratropium  1 puff Inhalation Q6H    dexamethasone  6 mg Oral Daily    remdesivir IVPB  100 mg Intravenous Q24H      dextrose 5 % and 0.9 % NaCl 100 mL/hr at 11/07/20 0506    sodium chloride Stopped (11/07/20 0817)     sodium chloride flush, acetaminophen **OR** acetaminophen, polyethylene glycol, ondansetron, sodium chloride, promethazine-codeine      PHYSICAL EXAM:  Vitals:    11/07/20 1300   BP: (!) 151/104   Pulse: 72   Resp: 22   Temp: 98 °F (36.7 °C)   SpO2: 92%           O2 Device: None (Room air)    General Appearance: alert and oriented to person, place and time, in no acute distress  Cardiovascular: normal rate, regular rhythm, normal S1 and S2, no murmurs, rubs, clicks, or gallops, distal pulses intact, no carotid bruits, no JVD  Pulmonary/Chest: clear to auscultation bilaterally- no wheezes, rales or rhonchi, normal air movement, no respiratory distress  Abdomen: soft, non-tender, non-distended, normal bowel sounds, no masses   Extremities: no cyanosis, clubbing or edema, pulse   Skin: warm and dry, no rash or erythema  Head: normocephalic and atraumatic  Eyes: pupils equal, round, and reactive to light  Neck: supple and non-tender without mass, no thyromegaly   Musculoskeletal: normal range of motion, no joint swelling, deformity or tenderness  Neurological: alert, oriented, normal speech, no focal findings or movement disorder noted    LABS:  WBC   Date Value Ref Range Status   11/07/2020 7.0 4.5 - 11.5 E9/L Final   11/06/2020 6.2 4.5 - 11.5 E9/L Final   11/05/2020 6.5 4.5 - 11.5 E9/L Final     Hemoglobin   Date Value Ref Range Status   11/07/2020 14.2 12.5 - 16.5 g/dL Final   11/06/2020 14.5 12.5 - 16.5 g/dL Final   11/05/2020 14.0 12.5 - 16.5 g/dL Final     Hematocrit   Date Value Ref Range Status   11/07/2020 40.3 37.0 - 54.0 % Final   11/06/2020 43.0 37.0 - 54.0 % Final   11/05/2020 40.7 37.0 - 54.0 % Final     MCV   Date Value Ref Range Status   11/07/2020 83.1 80.0 - 99.9 fL Final   11/06/2020 85.7 80.0 - 99.9 fL Final   11/05/2020 83.7 80.0 - 99.9 fL Final     Platelets   Date Value Ref Range Status   11/07/2020 324 130 - 450 E9/L Final   11/06/2020 282 130 - 450 E9/L Final   11/05/2020 256 130 - 450 E9/L Final     Sodium   Date Value Ref Range Status   11/07/2020 137 132 - 146 mmol/L Final   11/06/2020 139 132 - 146 mmol/L Final   11/05/2020 138 132 - 146 mmol/L Final     Potassium   Date Value Ref Range Status   11/07/2020 3.7 3.5 - 5.0 mmol/L Final   11/06/2020 3.9 3.5 - 5.0 mmol/L Final   11/05/2020 3.6 3.5 - 5.0 mmol/L Final     Chloride   Date Value Ref Range Status   11/07/2020 104 98 - 107 mmol/L Final 11/06/2020 103 98 - 107 mmol/L Final   11/05/2020 97 (L) 98 - 107 mmol/L Final     CO2   Date Value Ref Range Status   11/07/2020 24 22 - 29 mmol/L Final   11/06/2020 27 22 - 29 mmol/L Final   11/05/2020 25 22 - 29 mmol/L Final     BUN   Date Value Ref Range Status   11/07/2020 16 6 - 20 mg/dL Final   11/06/2020 14 6 - 20 mg/dL Final   11/05/2020 16 6 - 20 mg/dL Final     CREATININE   Date Value Ref Range Status   11/07/2020 0.9 0.7 - 1.2 mg/dL Final   11/06/2020 1.1 0.7 - 1.2 mg/dL Final   11/05/2020 1.3 (H) 0.7 - 1.2 mg/dL Final     Glucose   Date Value Ref Range Status   11/07/2020 150 (H) 74 - 99 mg/dL Final   11/06/2020 173 (H) 74 - 99 mg/dL Final   11/05/2020 137 (H) 74 - 99 mg/dL Final     Calcium   Date Value Ref Range Status   11/07/2020 8.5 (L) 8.6 - 10.2 mg/dL Final   11/06/2020 8.6 8.6 - 10.2 mg/dL Final   11/05/2020 9.1 8.6 - 10.2 mg/dL Final     Total Protein   Date Value Ref Range Status   11/07/2020 7.0 6.4 - 8.3 g/dL Final   11/06/2020 7.6 6.4 - 8.3 g/dL Final   11/05/2020 8.9 (H) 6.4 - 8.3 g/dL Final     Alb   Date Value Ref Range Status   11/07/2020 3.3 (L) 3.5 - 5.2 g/dL Final   11/06/2020 3.8 3.5 - 5.2 g/dL Final   11/05/2020 3.9 3.5 - 5.2 g/dL Final     Total Bilirubin   Date Value Ref Range Status   11/07/2020 0.4 0.0 - 1.2 mg/dL Final   11/06/2020 0.5 0.0 - 1.2 mg/dL Final   11/05/2020 0.7 0.0 - 1.2 mg/dL Final     Alkaline Phosphatase   Date Value Ref Range Status   11/07/2020 39 (L) 40 - 129 U/L Final   11/06/2020 44 40 - 129 U/L Final   11/05/2020 56 40 - 129 U/L Final     AST   Date Value Ref Range Status   11/07/2020 17 0 - 39 U/L Final   11/06/2020 23 0 - 39 U/L Final   11/05/2020 34 0 - 39 U/L Final     ALT   Date Value Ref Range Status   11/07/2020 36 0 - 40 U/L Final   11/06/2020 45 (H) 0 - 40 U/L Final   11/05/2020 62 (H) 0 - 40 U/L Final     GFR Non-   Date Value Ref Range Status   11/07/2020 >60 >=60 mL/min/1.73 Final     Comment:     Chronic Kidney Disease: less than 60 ml/min/1.73 sq.m. Kidney Failure: less than 15 ml/min/1.73 sq.m. Results valid for patients 18 years and older. 11/06/2020 >60 >=60 mL/min/1.73 Final     Comment:     Chronic Kidney Disease: less than 60 ml/min/1.73 sq.m. Kidney Failure: less than 15 ml/min/1.73 sq.m. Results valid for patients 18 years and older. 11/05/2020 >60 >=60 mL/min/1.73 Final     Comment:     Chronic Kidney Disease: less than 60 ml/min/1.73 sq.m. Kidney Failure: less than 15 ml/min/1.73 sq.m. Results valid for patients 18 years and older. GFR    Date Value Ref Range Status   11/07/2020 >60  Final   11/06/2020 >60  Final   11/05/2020 >60  Final     Magnesium   Date Value Ref Range Status   11/06/2020 2.5 1.6 - 2.6 mg/dL Final   11/05/2020 2.4 1.6 - 2.6 mg/dL Final     Phosphorus   Date Value Ref Range Status   11/06/2020 1.9 (L) 2.5 - 4.5 mg/dL Final     No results for input(s): PH, PO2, PCO2, HCO3, BE, O2SAT in the last 72 hours. RADIOLOGY:  CTA PULMONARY W CONTRAST   Final Result   1. No pulmonary embolism. 2.  Patchy airspace and interstitial opacities bilaterally related to   bilateral pneumonia. 3.  Perihilar lymphadenopathy notable on the right.            (Independently reviewed by me)      Venecia Navarrete M.D  Pulmonary & Critical Care  11/7/2020 2:44 PM

## 2020-11-07 NOTE — PROGRESS NOTES
Infectious Disease Progress Note     FU: COVID19     SUBJECTIVE   Seen at bedside  Ambulating   Alert and oriented  On room air   Clinically doing well   Tolerating medications without side effects   Hemoptysis has decreased     REVIEW OF SYSTEMS:    Negative except as above     PHYSICAL EXAM:  Vitals:    11/07/20 0025 11/07/20 0500 11/07/20 0800 11/07/20 0933   BP: (!) 127/96 137/85 132/85    Pulse: 73 84 72    Resp: 18 14 20    Temp: 98.1 °F (36.7 °C) 98 °F (36.7 °C) 98.1 °F (36.7 °C)    TempSrc: Oral Oral Infrared    SpO2: 90% (!) 87% 96%    Weight:       Height:    5' 8\" (1.727 m)       General Appearance:       Alert, cooperative, no distress, appears stated age        Heent:    Normocephalic, atraumatic,     PERRL, conjunctiva/corneas clear     no drainage or sinus tenderness      Neck:   Supple, symmetrical, trachea midline   Back:     Symmetric, no CVA tenderness   Lungs:     Clear to auscultation bilaterally, respirations unlabored   Chest Wall:    No tenderness or deformity    Heart:    Regular rate and rhythm, S1 and S2 normal, no murmur, rub or   gallop   Abdomen:     Soft, non-tender, bowel sounds active all four quadrants         Extremities:   Extremities normal, atraumatic, no cyanosis or edema   Pulses:   LE extremities   Skin:   Skin color, texture, turgor normal, no rashes or lesions   Lymph nodes:   Cervical, supraclavicular, and axillary nodes normal   Neurologic:   CNII-XII intact, no focal deficits  ROOM AIR        LABS AND DATA REVIEW:     Recent Labs     11/05/20  2250 11/06/20  0551 11/07/20  0452   WBC 6.5 6.2 7.0   HGB 14.0 14.5 14.2   HCT 40.7 43.0 40.3   MCV 83.7 85.7 83.1    282 324     Recent Labs     11/05/20 2123 11/06/20  0551 11/07/20  0452    139 137   K 3.6 3.9 3.7   CL 97* 103 104   CO2 25 27 24   BUN 16 14 16   CREATININE 1.3* 1.1 0.9   GFRAA >60 >60 >60   LABGLOM >60 >60 >60   GLUCOSE 137* 173* 150*   PROT 8.9* 7.6 7.0   LABALBU 3.9 3.8 3.3*   CALCIUM 9.1 8.6 8.5*   BILITOT 0.7 0.5 0.4   ALKPHOS 56 44 39*   AST 34 23 17   ALT 62* 45* 36       BLOOD CX #1  Recent Labs     11/05/20  2123   BC 24 Hours no growth     BLOOD CX #2  Recent Labs     11/05/20  2140   BLOODCULT2 24 Hours no growth       WOUND culture  No results for input(s): WNDABS in the last 72 hours. URINE CULTURE  No results for input(s): LABURIN in the last 72 hours. ASSESSMENT  This is a 38year old male with history of COVID19 diagnosed on 10/26 who presented to ER with SOB and hemoptysis. CTA showed bilateral pneumonia but no PE. Patient is on room air with no further hemoptysis. PLAN  Continue on Remdesivir day 2    Continue on Azithromycin and Cefepime   Continue Decadron and vitamins   Cycle biomakers  Clinically he is doing well on room air with mild SOB   All labs, cultures, imaging reviewed   Awaiting transfer to [facility] - clinically doing well       JONATHAN Stevenson - NP  11/7/2020  1:39 PM       I have discussed the case, including pertinent history and physical  exam findings . I have seen and examined the patient and the key elements of the encounter have been performed by me. I agree with the assessment, plan and orders as documented.       Treatment plan as per my recommendation     Pranav Waterman MD, FACP  11/7/2020  11:50 PM

## 2020-11-07 NOTE — PROGRESS NOTES
Internal Medicine Progress Note    [facility]    Hector Vecnes. Fabio Jessica., TREVONOKellyI. Oleksandr Tello D.O., KAREN Kaur D.O. Joshua Bajwa, MSN, APRN, NP-C  Chandler Lewis. Gisella Harrison, MSN, APRN-CNP     Primary Care Physician: Eduard Liao MD   Admitting Physician:  Devi Smith DO  Admission date and time: 11/5/2020  8:52 PM    Room:  [place] [address]  Admitting diagnosis: COVID-19 virus infection [U07.1]  COVID-19 virus infection [U07.1]    Patient Name: Mono Esparza  MRN: [id number]    Date of Service: 11/7/2020     Subjective:  Luis Fernando Ballesteros is a 38 y.o. male who was seen and examined today,11/7/2020, at the bedside. Luis Fernando Ballesteros is resting comfortably on room air. He admits to ongoing but minimal hemoptysis. Nursing reports no hemoptysis during the most recent shift. The critical care and infectious disease teams continue to provide consultation. We discussed the need to become more ambulatory. F/u COVID-19    To prevent transmission of COVID-19 and also the need to preserve PPE,  a HPI/ROS/PE with the patient was not performed. Pt was seen in icu   Pts chart was reviewed including laboratory results and  imaging. Care will be coordinated with the ID/icu physician/nurse. Physical Exam:  No intake/output data recorded. No intake or output data in the 24 hours ending 11/07/20 0725No intake/output data recorded. Patient Vitals for the past 96 hrs (Last 3 readings):   Weight   11/06/20 0026 174 lb (78.9 kg)   11/05/20 2101 185 lb (83.9 kg)     Vital Signs:   Blood pressure 137/85, pulse 84, temperature 98 °F (36.7 °C), temperature source Oral, resp. rate 14, height 5' 8\" (1.727 m), weight 174 lb (78.9 kg), SpO2 (!) 87 %.     Medication:  Scheduled Meds:   sodium chloride flush  10 mL Intravenous 2 times per day    enoxaparin  30 mg Subcutaneous BID    cefepime  2 g Intravenous Q12H    azithromycin  250 mg Intravenous Q24H    zinc sulfate  50 mg Oral Daily    vitamin C  1,000 mg Oral Daily    albuterol-ipratropium  1 puff Inhalation Q6H    dexamethasone  6 mg Oral Daily    remdesivir IVPB  100 mg Intravenous Q24H     Continuous Infusions:   dextrose 5 % and 0.9 % NaCl 100 mL/hr at 11/07/20 0506    sodium chloride 12.5 mL/hr at 11/07/20 0440       Objective Data:  CBC with Differential:    Lab Results   Component Value Date    WBC 7.0 11/07/2020    RBC 4.85 11/07/2020    HGB 14.2 11/07/2020    HCT 40.3 11/07/2020     11/07/2020    MCV 83.1 11/07/2020    MCH 29.3 11/07/2020    MCHC 35.2 11/07/2020    RDW 12.0 11/07/2020    LYMPHOPCT 10.0 11/07/2020    MONOPCT 6.0 11/07/2020    BASOPCT 0.0 11/07/2020    MONOSABS 0.42 11/07/2020    LYMPHSABS 0.70 11/07/2020    EOSABS 0.00 11/07/2020    BASOSABS 0.00 11/07/2020     CMP:    Lab Results   Component Value Date     11/07/2020    K 3.7 11/07/2020     11/07/2020    CO2 24 11/07/2020    BUN 16 11/07/2020    CREATININE 0.9 11/07/2020    GFRAA >60 11/07/2020    LABGLOM >60 11/07/2020    GLUCOSE 150 11/07/2020    PROT 7.0 11/07/2020    LABALBU 3.3 11/07/2020    CALCIUM 8.5 11/07/2020    BILITOT 0.4 11/07/2020    ALKPHOS 39 11/07/2020    AST 17 11/07/2020    ALT 36 11/07/2020              Assessment:  1. Sepsis secondary to COVID pneumonia resulting in transient periods of hemoptysis    Plan:   The patient reports intermittent periods of hemoptysis but nursing reports no recent hemoptysis. Maximal COVID protocol is being undertaken and both the critical care and infectious disease teams are providing consultation. I have encouraged the patient to become more ambulatory today. He is otherwise resting comfortably and I anticipate discharging the patient in the near future. Greater than 40 minutes of critical care time was spent with the patient. This includes chart review, , and discussion with those consultants involved in the patient's care.      More than 50% of my  time was

## 2020-11-08 LAB
ALBUMIN SERPL-MCNC: 3.1 G/DL (ref 3.5–5.2)
ALP BLD-CCNC: 35 U/L (ref 40–129)
ALT SERPL-CCNC: 37 U/L (ref 0–40)
ANION GAP SERPL CALCULATED.3IONS-SCNC: 8 MMOL/L (ref 7–16)
APTT: 27.4 SEC (ref 24.5–35.1)
AST SERPL-CCNC: 17 U/L (ref 0–39)
BASOPHILS ABSOLUTE: 0 E9/L (ref 0–0.2)
BASOPHILS RELATIVE PERCENT: 0.4 % (ref 0–2)
BILIRUB SERPL-MCNC: 0.4 MG/DL (ref 0–1.2)
BUN BLDV-MCNC: 13 MG/DL (ref 6–20)
CALCIUM SERPL-MCNC: 8.4 MG/DL (ref 8.6–10.2)
CHLORIDE BLD-SCNC: 106 MMOL/L (ref 98–107)
CO2: 25 MMOL/L (ref 22–29)
CREAT SERPL-MCNC: 0.8 MG/DL (ref 0.7–1.2)
D DIMER: 365 NG/ML DDU
EOSINOPHILS ABSOLUTE: 0 E9/L (ref 0.05–0.5)
EOSINOPHILS RELATIVE PERCENT: 0 % (ref 0–6)
FIBRINOGEN: 631 MG/DL (ref 225–540)
GFR AFRICAN AMERICAN: >60
GFR NON-AFRICAN AMERICAN: >60 ML/MIN/1.73
GLUCOSE BLD-MCNC: 135 MG/DL (ref 74–99)
HCT VFR BLD CALC: 39.8 % (ref 37–54)
HEMOGLOBIN: 13.6 G/DL (ref 12.5–16.5)
LYMPHOCYTES ABSOLUTE: 1.06 E9/L (ref 1.5–4)
LYMPHOCYTES RELATIVE PERCENT: 19.1 % (ref 20–42)
MCH RBC QN AUTO: 28.8 PG (ref 26–35)
MCHC RBC AUTO-ENTMCNC: 34.2 % (ref 32–34.5)
MCV RBC AUTO: 84.1 FL (ref 80–99.9)
MONOCYTES ABSOLUTE: 0.22 E9/L (ref 0.1–0.95)
MONOCYTES RELATIVE PERCENT: 4.3 % (ref 2–12)
NEUTROPHILS ABSOLUTE: 4.31 E9/L (ref 1.8–7.3)
NEUTROPHILS RELATIVE PERCENT: 76.5 % (ref 43–80)
PDW BLD-RTO: 12 FL (ref 11.5–15)
PLATELET # BLD: 366 E9/L (ref 130–450)
PMV BLD AUTO: 9.4 FL (ref 7–12)
POTASSIUM SERPL-SCNC: 3.4 MMOL/L (ref 3.5–5)
RBC # BLD: 4.73 E12/L (ref 3.8–5.8)
SODIUM BLD-SCNC: 139 MMOL/L (ref 132–146)
TOTAL PROTEIN: 6.7 G/DL (ref 6.4–8.3)
WBC # BLD: 5.6 E9/L (ref 4.5–11.5)

## 2020-11-08 PROCEDURE — 80053 COMPREHEN METABOLIC PANEL: CPT

## 2020-11-08 PROCEDURE — 85384 FIBRINOGEN ACTIVITY: CPT

## 2020-11-08 PROCEDURE — 94640 AIRWAY INHALATION TREATMENT: CPT

## 2020-11-08 PROCEDURE — 6370000000 HC RX 637 (ALT 250 FOR IP): Performed by: INTERNAL MEDICINE

## 2020-11-08 PROCEDURE — 85025 COMPLETE CBC W/AUTO DIFF WBC: CPT

## 2020-11-08 PROCEDURE — 85378 FIBRIN DEGRADE SEMIQUANT: CPT

## 2020-11-08 PROCEDURE — 2500000003 HC RX 250 WO HCPCS: Performed by: INTERNAL MEDICINE

## 2020-11-08 PROCEDURE — 6360000002 HC RX W HCPCS: Performed by: INTERNAL MEDICINE

## 2020-11-08 PROCEDURE — 2580000003 HC RX 258: Performed by: INTERNAL MEDICINE

## 2020-11-08 PROCEDURE — 2060000000 HC ICU INTERMEDIATE R&B

## 2020-11-08 PROCEDURE — 85730 THROMBOPLASTIN TIME PARTIAL: CPT

## 2020-11-08 RX ADMIN — DEXTROSE AND SODIUM CHLORIDE: 5; 900 INJECTION, SOLUTION INTRAVENOUS at 12:10

## 2020-11-08 RX ADMIN — IPRATROPIUM BROMIDE AND ALBUTEROL 1 PUFF: 20; 100 SPRAY, METERED RESPIRATORY (INHALATION) at 17:36

## 2020-11-08 RX ADMIN — Medication 5 ML: at 04:30

## 2020-11-08 RX ADMIN — OXYCODONE HYDROCHLORIDE AND ACETAMINOPHEN 1000 MG: 500 TABLET ORAL at 08:43

## 2020-11-08 RX ADMIN — IPRATROPIUM BROMIDE AND ALBUTEROL 1 PUFF: 20; 100 SPRAY, METERED RESPIRATORY (INHALATION) at 20:54

## 2020-11-08 RX ADMIN — SODIUM CHLORIDE: 9 INJECTION, SOLUTION INTRAVENOUS at 04:29

## 2020-11-08 RX ADMIN — Medication 5 ML: at 12:10

## 2020-11-08 RX ADMIN — SODIUM CHLORIDE: 9 INJECTION, SOLUTION INTRAVENOUS at 15:30

## 2020-11-08 RX ADMIN — Medication 10 ML: at 08:44

## 2020-11-08 RX ADMIN — CEFEPIME 2 G: 2 INJECTION, POWDER, FOR SOLUTION INTRAVENOUS at 12:11

## 2020-11-08 RX ADMIN — ZINC SULFATE 220 MG (50 MG) CAPSULE 50 MG: CAPSULE at 08:43

## 2020-11-08 RX ADMIN — REMDESIVIR 100 MG: 100 INJECTION, POWDER, LYOPHILIZED, FOR SOLUTION INTRAVENOUS at 16:58

## 2020-11-08 RX ADMIN — AZITHROMYCIN DIHYDRATE 250 MG: 500 INJECTION, POWDER, LYOPHILIZED, FOR SOLUTION INTRAVENOUS at 23:14

## 2020-11-08 RX ADMIN — ENOXAPARIN SODIUM 30 MG: 30 INJECTION SUBCUTANEOUS at 08:43

## 2020-11-08 RX ADMIN — CEFEPIME 2 G: 2 INJECTION, POWDER, FOR SOLUTION INTRAVENOUS at 00:29

## 2020-11-08 RX ADMIN — DEXAMETHASONE 6 MG: 6 TABLET ORAL at 08:44

## 2020-11-08 RX ADMIN — Medication 10 ML: at 19:54

## 2020-11-08 RX ADMIN — ENOXAPARIN SODIUM 30 MG: 30 INJECTION SUBCUTANEOUS at 19:53

## 2020-11-08 RX ADMIN — DEXTROSE AND SODIUM CHLORIDE: 5; 900 INJECTION, SOLUTION INTRAVENOUS at 04:20

## 2020-11-08 RX ADMIN — IPRATROPIUM BROMIDE AND ALBUTEROL 1 PUFF: 20; 100 SPRAY, METERED RESPIRATORY (INHALATION) at 09:08

## 2020-11-08 ASSESSMENT — PAIN SCALES - GENERAL
PAINLEVEL_OUTOF10: 0

## 2020-11-08 NOTE — PROGRESS NOTES
Pulmonary/Critical Care Progress Note  ISSUES:    COVID-19 virus infection associated pneumonia  Hemoptysis on the basis of above (low risk otherwise given on-smoker and CT findings)  Expect to clear with resolution of infection  Favor more aggressive COVID treatment based upon repeat hospitalization despite Antibiotics / Steroids    Started on Remdesivir to complete course  Decadron, Vit  No O2 need  Overall felling much better   Improved - no more cough or hemoptysis  Replace K+    D/c planning for when completes Remdesivir     __________________________________________________      HISTORY OF PRESENT ILLNESS:   Nikki Lang is a 38 y.o. male gerneally well,  presents with 2 weeks worth of GI sx, eveolving into cough, SOB, fever and episodes of hemoptysis, dark blood up to 1/4 cup per day max. Losing weight over this time  Came to hospital, started on Azithro / pred and sent home. Comes back with worsening sx. No drugs, smoking, vaping, ETOH      MEDICAL HISTORY:  History reviewed. No pertinent past medical history.     MEDICATIONS:   sodium chloride flush  10 mL Intravenous 2 times per day    enoxaparin  30 mg Subcutaneous BID    cefepime  2 g Intravenous Q12H    azithromycin  250 mg Intravenous Q24H    zinc sulfate  50 mg Oral Daily    vitamin C  1,000 mg Oral Daily    albuterol-ipratropium  1 puff Inhalation Q6H    dexamethasone  6 mg Oral Daily    remdesivir IVPB  100 mg Intravenous Q24H      dextrose 5 % and 0.9 % NaCl 100 mL/hr at 11/08/20 0629    sodium chloride Stopped (11/08/20 0629)     sodium chloride flush, acetaminophen **OR** acetaminophen, polyethylene glycol, ondansetron, sodium chloride, promethazine-codeine      PHYSICAL EXAM:  Vitals:    11/08/20 0629   BP:    Pulse: 60   Resp: 14   Temp:    SpO2: 92%           O2 Device: None (Room air)    General Appearance: alert and oriented to person, place and time, in no acute distress  Cardiovascular: normal rate, regular rhythm, normal S1 and S2, no murmurs, rubs, clicks, or gallops, distal pulses intact, no carotid bruits, no JVD  Pulmonary/Chest: clear to auscultation bilaterally- no wheezes, rales or rhonchi, normal air movement, no respiratory distress  Abdomen: soft, non-tender, non-distended, normal bowel sounds, no masses   Extremities: no cyanosis, clubbing or edema, pulse   Skin: warm and dry, no rash or erythema  Head: normocephalic and atraumatic  Eyes: pupils equal, round, and reactive to light  Neck: supple and non-tender without mass, no thyromegaly   Musculoskeletal: normal range of motion, no joint swelling, deformity or tenderness  Neurological: alert, oriented, normal speech, no focal findings or movement disorder noted    LABS:  WBC   Date Value Ref Range Status   11/08/2020 5.6 4.5 - 11.5 E9/L Final   11/07/2020 7.0 4.5 - 11.5 E9/L Final   11/06/2020 6.2 4.5 - 11.5 E9/L Final     Hemoglobin   Date Value Ref Range Status   11/08/2020 13.6 12.5 - 16.5 g/dL Final   11/07/2020 14.2 12.5 - 16.5 g/dL Final   11/06/2020 14.5 12.5 - 16.5 g/dL Final     Hematocrit   Date Value Ref Range Status   11/08/2020 39.8 37.0 - 54.0 % Final   11/07/2020 40.3 37.0 - 54.0 % Final   11/06/2020 43.0 37.0 - 54.0 % Final     MCV   Date Value Ref Range Status   11/08/2020 84.1 80.0 - 99.9 fL Final   11/07/2020 83.1 80.0 - 99.9 fL Final   11/06/2020 85.7 80.0 - 99.9 fL Final     Platelets   Date Value Ref Range Status   11/08/2020 366 130 - 450 E9/L Final   11/07/2020 324 130 - 450 E9/L Final   11/06/2020 282 130 - 450 E9/L Final     Sodium   Date Value Ref Range Status   11/08/2020 139 132 - 146 mmol/L Final   11/07/2020 137 132 - 146 mmol/L Final   11/06/2020 139 132 - 146 mmol/L Final     Potassium   Date Value Ref Range Status   11/08/2020 3.4 (L) 3.5 - 5.0 mmol/L Final   11/07/2020 3.7 3.5 - 5.0 mmol/L Final   11/06/2020 3.9 3.5 - 5.0 mmol/L Final     Chloride   Date Value Ref Range Status   11/08/2020 106 98 - 107 mmol/L Final   11/07/2020 104 98 - 107 mmol/L Final   11/06/2020 103 98 - 107 mmol/L Final     CO2   Date Value Ref Range Status   11/08/2020 25 22 - 29 mmol/L Final   11/07/2020 24 22 - 29 mmol/L Final   11/06/2020 27 22 - 29 mmol/L Final     BUN   Date Value Ref Range Status   11/08/2020 13 6 - 20 mg/dL Final   11/07/2020 16 6 - 20 mg/dL Final   11/06/2020 14 6 - 20 mg/dL Final     CREATININE   Date Value Ref Range Status   11/08/2020 0.8 0.7 - 1.2 mg/dL Final   11/07/2020 0.9 0.7 - 1.2 mg/dL Final   11/06/2020 1.1 0.7 - 1.2 mg/dL Final     Glucose   Date Value Ref Range Status   11/08/2020 135 (H) 74 - 99 mg/dL Final   11/07/2020 150 (H) 74 - 99 mg/dL Final   11/06/2020 173 (H) 74 - 99 mg/dL Final     Calcium   Date Value Ref Range Status   11/08/2020 8.4 (L) 8.6 - 10.2 mg/dL Final   11/07/2020 8.5 (L) 8.6 - 10.2 mg/dL Final   11/06/2020 8.6 8.6 - 10.2 mg/dL Final     Total Protein   Date Value Ref Range Status   11/08/2020 6.7 6.4 - 8.3 g/dL Final   11/07/2020 7.0 6.4 - 8.3 g/dL Final   11/06/2020 7.6 6.4 - 8.3 g/dL Final     Alb   Date Value Ref Range Status   11/08/2020 3.1 (L) 3.5 - 5.2 g/dL Final   11/07/2020 3.3 (L) 3.5 - 5.2 g/dL Final   11/06/2020 3.8 3.5 - 5.2 g/dL Final     Total Bilirubin   Date Value Ref Range Status   11/08/2020 0.4 0.0 - 1.2 mg/dL Final   11/07/2020 0.4 0.0 - 1.2 mg/dL Final   11/06/2020 0.5 0.0 - 1.2 mg/dL Final     Alkaline Phosphatase   Date Value Ref Range Status   11/08/2020 35 (L) 40 - 129 U/L Final   11/07/2020 39 (L) 40 - 129 U/L Final   11/06/2020 44 40 - 129 U/L Final     AST   Date Value Ref Range Status   11/08/2020 17 0 - 39 U/L Final   11/07/2020 17 0 - 39 U/L Final   11/06/2020 23 0 - 39 U/L Final     ALT   Date Value Ref Range Status   11/08/2020 37 0 - 40 U/L Final   11/07/2020 36 0 - 40 U/L Final   11/06/2020 45 (H) 0 - 40 U/L Final     GFR Non-   Date Value Ref Range Status   11/08/2020 >60 >=60 mL/min/1.73 Final     Comment:     Chronic Kidney Disease: less than 60 ml/min/1.73 sq.m. Kidney Failure: less than 15 ml/min/1.73 sq.m. Results valid for patients 18 years and older. 11/07/2020 >60 >=60 mL/min/1.73 Final     Comment:     Chronic Kidney Disease: less than 60 ml/min/1.73 sq.m. Kidney Failure: less than 15 ml/min/1.73 sq.m. Results valid for patients 18 years and older. 11/06/2020 >60 >=60 mL/min/1.73 Final     Comment:     Chronic Kidney Disease: less than 60 ml/min/1.73 sq.m. Kidney Failure: less than 15 ml/min/1.73 sq.m. Results valid for patients 18 years and older. GFR    Date Value Ref Range Status   11/08/2020 >60  Final   11/07/2020 >60  Final   11/06/2020 >60  Final     Magnesium   Date Value Ref Range Status   11/06/2020 2.5 1.6 - 2.6 mg/dL Final   11/05/2020 2.4 1.6 - 2.6 mg/dL Final     Phosphorus   Date Value Ref Range Status   11/06/2020 1.9 (L) 2.5 - 4.5 mg/dL Final     No results for input(s): PH, PO2, PCO2, HCO3, BE, O2SAT in the last 72 hours. RADIOLOGY:  CTA PULMONARY W CONTRAST   Final Result   1. No pulmonary embolism. 2.  Patchy airspace and interstitial opacities bilaterally related to   bilateral pneumonia. 3.  Perihilar lymphadenopathy notable on the right.            (Independently reviewed by me)      Josiah Acosta M.D  Pulmonary & Critical Care  11/8/2020 8:15 AM

## 2020-11-08 NOTE — PROGRESS NOTES
Infectious Disease Progress Note     FU: COVID19     SUBJECTIVE   Seen at bedside  Alert and oriented  On room air   Clinically doing well - does c/o cough and SOB with exertion   Tolerating medications without side effects     REVIEW OF SYSTEMS:    Negative except as above     PHYSICAL EXAM:  Vitals:    11/08/20 0600 11/08/20 0629 11/08/20 0800 11/08/20 1200   BP:   (!) 155/99 (!) 138/94   Pulse: 63 60 62 63   Resp: 18 14 18 16   Temp:   98.6 °F (37 °C) 98.7 °F (37.1 °C)   TempSrc:   Infrared Infrared   SpO2: 94% 92% 92% 92%   Weight:       Height:           General Appearance:       Alert, cooperative, no distress, appears stated age ++COUGH         Heent:    Normocephalic, atraumatic,     PERRL, conjunctiva/corneas clear     no drainage or sinus tenderness      Neck:   Supple, symmetrical, trachea midline   Back:     Symmetric, no CVA tenderness   Lungs:     Clear to auscultation bilaterally, respirations unlabored   Chest Wall:    No tenderness or deformity    Heart:    Regular rate and rhythm, S1 and S2 normal, no murmur, rub or   gallop   Abdomen:     Soft, non-tender, bowel sounds active all four quadrants         Extremities:   Extremities normal, atraumatic, no cyanosis or edema   Pulses:   LE extremities   Skin:   Skin color, texture, turgor normal, no rashes or lesions   Lymph nodes:   Cervical, supraclavicular, and axillary nodes normal   Neurologic:   CNII-XII intact, no focal deficits  ROOM AIR        LABS AND DATA REVIEW:     Recent Labs     11/06/20  0551 11/07/20  0452 11/08/20  0601   WBC 6.2 7.0 5.6   HGB 14.5 14.2 13.6   HCT 43.0 40.3 39.8   MCV 85.7 83.1 84.1    324 366     Recent Labs     11/06/20  0551 11/07/20  0452 11/08/20  0601    137 139   K 3.9 3.7 3.4*    104 106   CO2 27 24 25   BUN 14 16 13   CREATININE 1.1 0.9 0.8   GFRAA >60 >60 >60   LABGLOM >60 >60 >60   GLUCOSE 173* 150* 135*   PROT 7.6 7.0 6.7   LABALBU 3.8 3.3* 3.1*   CALCIUM 8.6 8.5* 8.4*   BILITOT 0.5 0.4 0. 4   ALKPHOS 44 39* 35*   AST 23 17 17   ALT 45* 36 37       BLOOD CX #1  Recent Labs     11/05/20  2123   BC 24 Hours no growth     BLOOD CX #2  Recent Labs     11/05/20  2140   BLOODCULT2 24 Hours no growth       WOUND culture  No results for input(s): WNDABS in the last 72 hours. URINE CULTURE  No results for input(s): LABURIN in the last 72 hours. ASSESSMENT  This is a 38year old male with history of COVID19 diagnosed on 10/26 who presented to ER with SOB and hemoptysis. CTA showed bilateral pneumonia but no PE. Patient is on room air with no further hemoptysis. PLAN  Continue on Remdesivir day 3  Continue on Azithromycin and Cefepime   Continue Decadron and vitamins   Cycle biomakers  Clinically he is doing well on room air with mild SOB, cough  All labs, cultures, imaging reviewed     JONATHAN Babcock NP  11/8/2020  2:00 PM       Patient examined along with the nurse practitioner, agree with above  Patient tolerating remdesivir azithromycin and Decadron well  Clinically stable on room air okay to discontinue cefepime    To transfer the patient to stepdown unit  Follow inflammatory markers    I have discussed the case, including pertinent history and physical  exam findings . I have seen and examined the patient and the key elements of the encounter have been performed by me. I agree with the assessment, plan and orders as documented.       Treatment plan as per my recommendation     Ekaterina Katz MD, FACP  11/8/2020  3:06 PM

## 2020-11-08 NOTE — PLAN OF CARE
Problem: Falls - Risk of:  Goal: Will remain free from falls  Description: Will remain free from falls  Outcome: Met This Shift     Problem: Isolation Precautions - Risk of Spread of Infection  Goal: Prevent transmission of infection  Outcome: Met This Shift     Problem: Risk for Fluid Volume Deficit  Goal: Maintain normal heart rhythm  Outcome: Met This Shift     Problem: Risk for Fluid Volume Deficit  Goal: Maintain absence of muscle cramping  Outcome: Met This Shift     Problem: Risk for Fluid Volume Deficit  Goal: Maintain normal serum potassium, sodium, calcium, phosphorus, and pH  Outcome: Met This Shift     Problem: Nutrition Deficits  Goal: Optimize nutrtional status  Outcome: Not Met This Shift

## 2020-11-09 ENCOUNTER — APPOINTMENT (OUTPATIENT)
Dept: GENERAL RADIOLOGY | Age: 35
DRG: 871 | End: 2020-11-09
Payer: COMMERCIAL

## 2020-11-09 LAB
ALBUMIN SERPL-MCNC: 3 G/DL (ref 3.5–5.2)
ALP BLD-CCNC: 42 U/L (ref 40–129)
ALT SERPL-CCNC: 45 U/L (ref 0–40)
ANION GAP SERPL CALCULATED.3IONS-SCNC: 11 MMOL/L (ref 7–16)
APTT: 29.5 SEC (ref 24.5–35.1)
AST SERPL-CCNC: 21 U/L (ref 0–39)
ATYPICAL LYMPHOCYTE RELATIVE PERCENT: 1 % (ref 0–4)
BASOPHILS ABSOLUTE: 0 E9/L (ref 0–0.2)
BASOPHILS RELATIVE PERCENT: 0 % (ref 0–2)
BILIRUB SERPL-MCNC: 0.4 MG/DL (ref 0–1.2)
BUN BLDV-MCNC: 9 MG/DL (ref 6–20)
CALCIUM SERPL-MCNC: 8.1 MG/DL (ref 8.6–10.2)
CHLORIDE BLD-SCNC: 103 MMOL/L (ref 98–107)
CO2: 24 MMOL/L (ref 22–29)
CREAT SERPL-MCNC: 0.8 MG/DL (ref 0.7–1.2)
D DIMER: 239 NG/ML DDU
EOSINOPHILS ABSOLUTE: 0 E9/L (ref 0.05–0.5)
EOSINOPHILS RELATIVE PERCENT: 0 % (ref 0–6)
FIBRINOGEN: 414 MG/DL (ref 225–540)
GFR AFRICAN AMERICAN: >60
GFR NON-AFRICAN AMERICAN: >60 ML/MIN/1.73
GLUCOSE BLD-MCNC: 120 MG/DL (ref 74–99)
HCT VFR BLD CALC: 38.9 % (ref 37–54)
HEMOGLOBIN: 13.3 G/DL (ref 12.5–16.5)
LYMPHOCYTES ABSOLUTE: 2.01 E9/L (ref 1.5–4)
LYMPHOCYTES RELATIVE PERCENT: 32 % (ref 20–42)
MCH RBC QN AUTO: 28.7 PG (ref 26–35)
MCHC RBC AUTO-ENTMCNC: 34.2 % (ref 32–34.5)
MCV RBC AUTO: 84 FL (ref 80–99.9)
METAMYELOCYTES RELATIVE PERCENT: 1 % (ref 0–1)
MONOCYTES ABSOLUTE: 0.18 E9/L (ref 0.1–0.95)
MONOCYTES RELATIVE PERCENT: 3 % (ref 2–12)
NEUTROPHILS ABSOLUTE: 3.9 E9/L (ref 1.8–7.3)
NEUTROPHILS RELATIVE PERCENT: 63 % (ref 43–80)
PDW BLD-RTO: 12 FL (ref 11.5–15)
PLATELET # BLD: 392 E9/L (ref 130–450)
PMV BLD AUTO: 9.3 FL (ref 7–12)
POLYCHROMASIA: ABNORMAL
POTASSIUM SERPL-SCNC: 3.3 MMOL/L (ref 3.5–5)
RBC # BLD: 4.63 E12/L (ref 3.8–5.8)
SODIUM BLD-SCNC: 138 MMOL/L (ref 132–146)
TOTAL PROTEIN: 6.6 G/DL (ref 6.4–8.3)
VITAMIN D 25-HYDROXY: 13 NG/ML (ref 30–100)
WBC # BLD: 6.1 E9/L (ref 4.5–11.5)

## 2020-11-09 PROCEDURE — 6360000002 HC RX W HCPCS: Performed by: INTERNAL MEDICINE

## 2020-11-09 PROCEDURE — 6370000000 HC RX 637 (ALT 250 FOR IP): Performed by: INTERNAL MEDICINE

## 2020-11-09 PROCEDURE — 2580000003 HC RX 258: Performed by: INTERNAL MEDICINE

## 2020-11-09 PROCEDURE — 94640 AIRWAY INHALATION TREATMENT: CPT

## 2020-11-09 PROCEDURE — 80053 COMPREHEN METABOLIC PANEL: CPT

## 2020-11-09 PROCEDURE — 6370000000 HC RX 637 (ALT 250 FOR IP): Performed by: SPECIALIST

## 2020-11-09 PROCEDURE — 71045 X-RAY EXAM CHEST 1 VIEW: CPT

## 2020-11-09 PROCEDURE — 85384 FIBRINOGEN ACTIVITY: CPT

## 2020-11-09 PROCEDURE — 2500000003 HC RX 250 WO HCPCS: Performed by: INTERNAL MEDICINE

## 2020-11-09 PROCEDURE — 85730 THROMBOPLASTIN TIME PARTIAL: CPT

## 2020-11-09 PROCEDURE — 36415 COLL VENOUS BLD VENIPUNCTURE: CPT

## 2020-11-09 PROCEDURE — 2060000000 HC ICU INTERMEDIATE R&B

## 2020-11-09 PROCEDURE — 85378 FIBRIN DEGRADE SEMIQUANT: CPT

## 2020-11-09 PROCEDURE — 82306 VITAMIN D 25 HYDROXY: CPT

## 2020-11-09 PROCEDURE — 85025 COMPLETE CBC W/AUTO DIFF WBC: CPT

## 2020-11-09 RX ORDER — POTASSIUM BICARBONATE 25 MEQ/1
50 TABLET, EFFERVESCENT ORAL ONCE
Status: DISCONTINUED | OUTPATIENT
Start: 2020-11-09 | End: 2020-11-09 | Stop reason: CLARIF

## 2020-11-09 RX ORDER — VITAMIN B COMPLEX
1000 TABLET ORAL DAILY
Status: DISCONTINUED | OUTPATIENT
Start: 2020-11-09 | End: 2020-11-10

## 2020-11-09 RX ORDER — THIAMINE MONONITRATE (VIT B1) 100 MG
100 TABLET ORAL DAILY
Status: DISCONTINUED | OUTPATIENT
Start: 2020-11-09 | End: 2020-11-10 | Stop reason: HOSPADM

## 2020-11-09 RX ADMIN — DEXAMETHASONE 6 MG: 6 TABLET ORAL at 08:10

## 2020-11-09 RX ADMIN — DEXTROSE AND SODIUM CHLORIDE: 5; 900 INJECTION, SOLUTION INTRAVENOUS at 06:37

## 2020-11-09 RX ADMIN — IPRATROPIUM BROMIDE AND ALBUTEROL 1 PUFF: 20; 100 SPRAY, METERED RESPIRATORY (INHALATION) at 22:43

## 2020-11-09 RX ADMIN — Medication 100 MG: at 22:17

## 2020-11-09 RX ADMIN — POTASSIUM BICARBONATE 40 MEQ: 782 TABLET, EFFERVESCENT ORAL at 12:09

## 2020-11-09 RX ADMIN — OXYCODONE HYDROCHLORIDE AND ACETAMINOPHEN 1000 MG: 500 TABLET ORAL at 08:10

## 2020-11-09 RX ADMIN — CEFEPIME 2 G: 2 INJECTION, POWDER, FOR SOLUTION INTRAVENOUS at 12:09

## 2020-11-09 RX ADMIN — CEFEPIME 2 G: 2 INJECTION, POWDER, FOR SOLUTION INTRAVENOUS at 23:13

## 2020-11-09 RX ADMIN — REMDESIVIR 100 MG: 100 INJECTION, POWDER, LYOPHILIZED, FOR SOLUTION INTRAVENOUS at 17:03

## 2020-11-09 RX ADMIN — Medication 10 ML: at 22:22

## 2020-11-09 RX ADMIN — ENOXAPARIN SODIUM 30 MG: 30 INJECTION SUBCUTANEOUS at 08:11

## 2020-11-09 RX ADMIN — Medication 10 ML: at 08:11

## 2020-11-09 RX ADMIN — SODIUM CHLORIDE: 9 INJECTION, SOLUTION INTRAVENOUS at 02:20

## 2020-11-09 RX ADMIN — CHOLECALCIFEROL TAB 25 MCG (1000 UNIT) 1000 UNITS: 25 TAB at 22:15

## 2020-11-09 RX ADMIN — SODIUM CHLORIDE: 9 INJECTION, SOLUTION INTRAVENOUS at 15:00

## 2020-11-09 RX ADMIN — IPRATROPIUM BROMIDE AND ALBUTEROL 1 PUFF: 20; 100 SPRAY, METERED RESPIRATORY (INHALATION) at 16:10

## 2020-11-09 RX ADMIN — AZITHROMYCIN DIHYDRATE 250 MG: 500 INJECTION, POWDER, LYOPHILIZED, FOR SOLUTION INTRAVENOUS at 22:16

## 2020-11-09 RX ADMIN — CEFEPIME 2 G: 2 INJECTION, POWDER, FOR SOLUTION INTRAVENOUS at 00:20

## 2020-11-09 RX ADMIN — ZINC SULFATE 220 MG (50 MG) CAPSULE 50 MG: CAPSULE at 08:10

## 2020-11-09 ASSESSMENT — PAIN SCALES - GENERAL
PAINLEVEL_OUTOF10: 0

## 2020-11-09 NOTE — CARE COORDINATION
11/9/2020 pt explains that he and his girlfriend have been trying to get medical paperwork for work to be completed by a doctor since last Friday. pt states to CM he has no pcp - he does not see Dr. Elena Ritchie. Pt's mother is making an appointment for a couple weeks from now with Dr. Cesar Boy explained that I could easily make that appointment for him and he said I have it right here and my mom knows him and will be making the appointment - I said make sure they know you are Positive Covid- because they will not likely want to see you right away- pt understood. Patient advocate was requested to speak to patient and his girlfriend about the needed paperwork.  Electronically signed by Sunday Sexton RN on 11/9/2020 at 1:49 PM

## 2020-11-09 NOTE — PLAN OF CARE
Problem: Falls - Risk of:  Goal: Will remain free from falls  Description: Will remain free from falls  Outcome: Met This Shift     Problem: Falls - Risk of:  Goal: Absence of physical injury  Description: Absence of physical injury  Outcome: Met This Shift     Problem: Isolation Precautions - Risk of Spread of Infection  Goal: Prevent transmission of infection  Outcome: Met This Shift     Problem: Nutrition Deficits  Goal: Optimize nutrtional status  Outcome: Met This Shift     Problem: Risk for Fluid Volume Deficit  Goal: Maintain normal heart rhythm  Outcome: Met This Shift     Problem: Risk for Fluid Volume Deficit  Goal: Maintain absence of muscle cramping  Outcome: Met This Shift     Problem: Risk for Fluid Volume Deficit  Goal: Maintain normal serum potassium, sodium, calcium, phosphorus, and pH  Outcome: Met This Shift

## 2020-11-09 NOTE — CARE COORDINATION
11/9/2020 Positive covid (11/6/2020). CM transition of care: patient continues in icu/isolation/remdesivir/oral decadron/zithromax/maxiipime iv. Pt is a transfer since 11/6 to [facility]. Plans for discharge to home when pt is medically stable. He is on room air.  Electronically signed by EMERITA Nogueira on 11/9/2020 at 12:09 PM

## 2020-11-09 NOTE — PROGRESS NOTES
[facility] PROGRESS NOTE    F/u SARS-COV-2 infection FACE TO FACE    SUBJECTIVE:  Lucille Flores, 38 y.o., male     Pt was discussed with care team  Pt was on the phone  Nurse present  He has no c/o feels better no f/c    ROS:GENERAL-             GENERAL:Temperature:  Current - Temp: 98.5 °F (36.9 °C);  Max - Temp  Av.5 °F (36.9 °C)  Min: 98.4 °F (36.9 °C)  Max: 98.5 °F (36.9 °C)  Respiratory Rate : Resp  Av.7  Min: 14  Max: 25  Pulse Range: Pulse  Av.2  Min: 48  Max: 65  Blood Pressure Range:  Systolic (39IMN), GJO:092 , Min:130 , LBI:985   ; Diastolic (66CMB), YAF:44, Min:88, Max:91    Pulse ox Range: SpO2  Av.4 %  Min: 87 %  Max: 93 %  24hr I & O:      Intake/Output Summary (Last 24 hours) at 2020 1731  Last data filed at 2020 1249  Gross per 24 hour   Intake 1961 ml   Output --   Net 1961 ml       CONSTITUTIONAL:   Awake, alert  HEENT:    AT/NC  LUNGS:   Dec bs ant   CARDIOVASCULAR:   S1 and S2   ABDOMEN:     Normal bowel sounds, non-distended, non-tender   EXTREMITIES:   FROM    SKIN:     NO RASH   CNS:    NAD  PSYCH:   Pleasant     MEDS:  [START ON 11/10/2020] enoxaparin (LOVENOX) injection 40 mg, Daily  sodium chloride flush 0.9 % injection 10 mL, 2 times per day  sodium chloride flush 0.9 % injection 10 mL, PRN  acetaminophen (TYLENOL) tablet 650 mg, Q6H PRN    Or  acetaminophen (TYLENOL) suppository 650 mg, Q6H PRN  polyethylene glycol (GLYCOLAX) packet 17 g, Daily PRN  ondansetron (ZOFRAN) injection 4 mg, Q6H PRN  cefepime (MAXIPIME) 2 g IVPB extended (mini-bag), Q12H  azithromycin (ZITHROMAX) 250 mg in dextrose 5 % 250 mL IVPB, Q24H  zinc sulfate (ZINCATE) capsule 50 mg, Daily  vitamin C (ASCORBIC ACID) tablet 1,000 mg, Daily  0.9 % sodium chloride infusion, Q12H  albuterol-ipratropium (COMBIVENT RESPIMAT)  MCG/ACT inhaler 1 puff, Q6H  dexamethasone (DECADRON) tablet 6 mg, Daily  remdesivir 100 mg in sodium 11/05/2020       SARS-COV-2- positive with pneumonia  biomarkers improving ion RA will stop Remdesivir   · DECADRON 6MG QDAY  x10 days    · Vitamins   Check procal       [START ON 11/10/2020] enoxaparin (LOVENOX) injection 40 mg, Daily  cefepime (MAXIPIME) 2 g IVPB extended (mini-bag), Q12H  azithromycin (ZITHROMAX) 250 mg in dextrose 5 % 250 mL IVPB, Q24H  zinc sulfate (ZINCATE) capsule 50 mg, Daily  vitamin C (ASCORBIC ACID) tablet 1,000 mg, Daily  dexamethasone (DECADRON) tablet 6 mg, Daily  remdesivir 100 mg in sodium chloride 0.9 % 250 mL IVPB, Q24H           Follow COVID-19/SARS-COV-2- BIOMARKERS     Crp  Procal  Ferritin  Ldh  Troponin   Ddimer  Fibrinogen:  Inr  PROTIME  Cytokine panel   Type and cross  Baseline triglyceride/LIPID PANEL   DVT prophylaxis/TREATMENT    PLAN: CONTINUE CURRENT MEDICATIONS AND SUPPORTIVE CARE.       Electronically signed by Kirsty St MD on 11/9/20 at 5:31 PM EST

## 2020-11-09 NOTE — PROGRESS NOTES
Intravenous Q12H    azithromycin  250 mg Intravenous Q24H    zinc sulfate  50 mg Oral Daily    vitamin C  1,000 mg Oral Daily    albuterol-ipratropium  1 puff Inhalation Q6H    dexamethasone  6 mg Oral Daily    remdesivir IVPB  100 mg Intravenous Q24H     Continuous Infusions:   sodium chloride Stopped (11/09/20 0420)       Objective Data:  CBC with Differential:    Lab Results   Component Value Date    WBC 6.1 11/09/2020    RBC 4.63 11/09/2020    HGB 13.3 11/09/2020    HCT 38.9 11/09/2020     11/09/2020    MCV 84.0 11/09/2020    MCH 28.7 11/09/2020    MCHC 34.2 11/09/2020    RDW 12.0 11/09/2020    METASPCT 1.0 11/09/2020    LYMPHOPCT 32.0 11/09/2020    MONOPCT 3.0 11/09/2020    BASOPCT 0.0 11/09/2020    MONOSABS 0.18 11/09/2020    LYMPHSABS 2.01 11/09/2020    EOSABS 0.00 11/09/2020    BASOSABS 0.00 11/09/2020     CMP:    Lab Results   Component Value Date     11/09/2020    K 3.3 11/09/2020     11/09/2020    CO2 24 11/09/2020    BUN 9 11/09/2020    CREATININE 0.8 11/09/2020    GFRAA >60 11/09/2020    LABGLOM >60 11/09/2020    GLUCOSE 120 11/09/2020    PROT 6.6 11/09/2020    LABALBU 3.0 11/09/2020    CALCIUM 8.1 11/09/2020    BILITOT 0.4 11/09/2020    ALKPHOS 42 11/09/2020    AST 21 11/09/2020    ALT 45 11/09/2020              Assessment:  1. Sepsis secondary to COVID pneumonia resulting in transient periods of hemoptysis    Plan:   Further anti-infective therapy as per the infectious disease team as the patient continues to improve. Maximal Covid protocol is being undertaken and he will complete a course of remdesivir tomorrow. He has been ambulatory throughout the room and is using the incentive spirometer. I anticipate discharge home tomorrow. Greater than 40 minutes of critical care time was spent with the patient. This includes chart review, , and discussion with those consultants involved in the patient's care.      More than 50% of my  time was spent at the bedside counseling/coordinating care with the patient and/or family with face to face contact. This time was spent reviewing notes and laboratory data as well as instructing and counseling the patient. Time I spent with the family or surrogate(s) is included only if the patient was incapable of providing the necessary information or participating in medical decisions. I also discussed the differential diagnosis and all of the proposed management plans with the patient and individuals accompanying the patient. Luis Fernando Ballesteros requires this high level of physician care and nursing on the IMC/Telemetry unit due the complexity of decision management and chance of rapid decline or death. Continued cardiac monitoring and higher level of nursing are required. I am readily available for any further decision-making and intervention.      Samir Torrez DO, F.A.C.O.I.  11/9/2020  10:56 AM

## 2020-11-09 NOTE — PROGRESS NOTES
Pulmonary/Critical Care Progress Note    We are following patient for COVID-19 pneumonitis, hypokalemia, hemoptysis, now cleared nausea vomiting    SUBJECTIVE:  Patient is feeling considerably better. He is not using oxygen and is saturating adequately, currently at 94%. He is mildly hypokalemic with a potassium of 3.5 and will require a one-time dose of potassium. This could possibly be early related to the dexamethasone he is receiving. He is also in the midst of a 5-day course of remdesivir. We will get a chest x-ray today since he has not had one during this admission. IV antibiotics are being given by the infectious disease service in the form of cefepime and azithromycin. Is also receiving vitamin C and zinc.  We will check a vitamin D level as well. MEDICATIONS:   potassium bicarbonate  50 mEq Oral Once    sodium chloride flush  10 mL Intravenous 2 times per day    enoxaparin  30 mg Subcutaneous BID    cefepime  2 g Intravenous Q12H    azithromycin  250 mg Intravenous Q24H    zinc sulfate  50 mg Oral Daily    vitamin C  1,000 mg Oral Daily    albuterol-ipratropium  1 puff Inhalation Q6H    dexamethasone  6 mg Oral Daily    remdesivir IVPB  100 mg Intravenous Q24H      sodium chloride Stopped (11/09/20 0420)     sodium chloride flush, acetaminophen **OR** acetaminophen, polyethylene glycol, ondansetron, sodium chloride, promethazine-codeine      REVIEW OF SYSTEMS:  Constitutional: Denies fever, weight loss, night sweats, and fatigue  Skin: Denies pigmentation, dark lesions, and rashes   HEENT: Denies hearing loss, tinnitus, ear drainage, epistaxis, sore throat, and hoarseness. Cardiovascular: Denies palpitations, chest pain, and chest pressure. Respiratory: Denies cough, dyspnea at rest, hemoptysis, apnea, and choking.   Gastrointestinal: Denies nausea, vomiting, poor appetite, diarrhea, heartburn or reflux  Genitourinary: Denies dysuria, frequency, urgency or hematuria  Musculoskeletal: Denies myalgias, muscle weakness, and bone pain  Neurological: Denies dizziness, vertigo, headache, and focal weakness  Psychological: Denies anxiety and depression  Endocrine: Denies heat intolerance and cold intolerance  Hematopoietic/Lymphatic: Denies bleeding problems and blood transfusions    OBJECTIVE:  Vitals:    11/09/20 0810   BP: (!) 154/91   Pulse: 52   Resp: 25   Temp:    SpO2: 91%           O2 Device: None (Room air)    PHYSICAL EXAM:  Constitutional: No fever, chills, diaphoresis  Skin: No skin rash, no skin breakdown  HEENT: Unremarkable  Neck: JVD, lymphadenopathy, thyromegaly  Cardiovascular: S1, S2 normal.  No S3, S4 murmurs or rubs present  Respiratory: Few scattered inspiratory crackles more prominent at the bases. No wheezing is heard  Gastrointestinal: Soft, flat, nontender  Genitourinary: No CVA tenderness  Extremities: No clubbing, cyanosis, or edema  Neurological: Awake, alert, oriented x3.   No evidence of focal motor or sensory deficits  Psychological: Appropriate affect    LABS:  WBC   Date Value Ref Range Status   11/09/2020 6.1 4.5 - 11.5 E9/L Final   11/08/2020 5.6 4.5 - 11.5 E9/L Final   11/07/2020 7.0 4.5 - 11.5 E9/L Final     Hemoglobin   Date Value Ref Range Status   11/09/2020 13.3 12.5 - 16.5 g/dL Final   11/08/2020 13.6 12.5 - 16.5 g/dL Final   11/07/2020 14.2 12.5 - 16.5 g/dL Final     Hematocrit   Date Value Ref Range Status   11/09/2020 38.9 37.0 - 54.0 % Final   11/08/2020 39.8 37.0 - 54.0 % Final   11/07/2020 40.3 37.0 - 54.0 % Final     MCV   Date Value Ref Range Status   11/09/2020 84.0 80.0 - 99.9 fL Final   11/08/2020 84.1 80.0 - 99.9 fL Final   11/07/2020 83.1 80.0 - 99.9 fL Final     Platelets   Date Value Ref Range Status   11/09/2020 392 130 - 450 E9/L Final   11/08/2020 366 130 - 450 E9/L Final   11/07/2020 324 130 - 450 E9/L Final     Sodium   Date Value Ref Range Status   11/09/2020 138 132 - 146 mmol/L Final   11/08/2020 139 132 - 146 mmol/L Final   11/07/2020 137 132 - 146 mmol/L Final     Potassium   Date Value Ref Range Status   11/09/2020 3.3 (L) 3.5 - 5.0 mmol/L Final   11/08/2020 3.4 (L) 3.5 - 5.0 mmol/L Final   11/07/2020 3.7 3.5 - 5.0 mmol/L Final     Chloride   Date Value Ref Range Status   11/09/2020 103 98 - 107 mmol/L Final   11/08/2020 106 98 - 107 mmol/L Final   11/07/2020 104 98 - 107 mmol/L Final     CO2   Date Value Ref Range Status   11/09/2020 24 22 - 29 mmol/L Final   11/08/2020 25 22 - 29 mmol/L Final   11/07/2020 24 22 - 29 mmol/L Final     BUN   Date Value Ref Range Status   11/09/2020 9 6 - 20 mg/dL Final   11/08/2020 13 6 - 20 mg/dL Final   11/07/2020 16 6 - 20 mg/dL Final     CREATININE   Date Value Ref Range Status   11/09/2020 0.8 0.7 - 1.2 mg/dL Final   11/08/2020 0.8 0.7 - 1.2 mg/dL Final   11/07/2020 0.9 0.7 - 1.2 mg/dL Final     Glucose   Date Value Ref Range Status   11/09/2020 120 (H) 74 - 99 mg/dL Final   11/08/2020 135 (H) 74 - 99 mg/dL Final   11/07/2020 150 (H) 74 - 99 mg/dL Final     Calcium   Date Value Ref Range Status   11/09/2020 8.1 (L) 8.6 - 10.2 mg/dL Final   11/08/2020 8.4 (L) 8.6 - 10.2 mg/dL Final   11/07/2020 8.5 (L) 8.6 - 10.2 mg/dL Final     Total Protein   Date Value Ref Range Status   11/09/2020 6.6 6.4 - 8.3 g/dL Final   11/08/2020 6.7 6.4 - 8.3 g/dL Final   11/07/2020 7.0 6.4 - 8.3 g/dL Final     Alb   Date Value Ref Range Status   11/09/2020 3.0 (L) 3.5 - 5.2 g/dL Final   11/08/2020 3.1 (L) 3.5 - 5.2 g/dL Final   11/07/2020 3.3 (L) 3.5 - 5.2 g/dL Final     Total Bilirubin   Date Value Ref Range Status   11/09/2020 0.4 0.0 - 1.2 mg/dL Final   11/08/2020 0.4 0.0 - 1.2 mg/dL Final   11/07/2020 0.4 0.0 - 1.2 mg/dL Final     Alkaline Phosphatase   Date Value Ref Range Status   11/09/2020 42 40 - 129 U/L Final   11/08/2020 35 (L) 40 - 129 U/L Final   11/07/2020 39 (L) 40 - 129 U/L Final     AST   Date Value Ref Range Status   11/09/2020 21 0 - 39 U/L Final   11/08/2020 17 0 - 39 U/L Final 11/07/2020 17 0 - 39 U/L Final     ALT   Date Value Ref Range Status   11/09/2020 45 (H) 0 - 40 U/L Final   11/08/2020 37 0 - 40 U/L Final   11/07/2020 36 0 - 40 U/L Final     GFR Non-   Date Value Ref Range Status   11/09/2020 >60 >=60 mL/min/1.73 Final     Comment:     Chronic Kidney Disease: less than 60 ml/min/1.73 sq.m. Kidney Failure: less than 15 ml/min/1.73 sq.m. Results valid for patients 18 years and older. 11/08/2020 >60 >=60 mL/min/1.73 Final     Comment:     Chronic Kidney Disease: less than 60 ml/min/1.73 sq.m. Kidney Failure: less than 15 ml/min/1.73 sq.m. Results valid for patients 18 years and older. 11/07/2020 >60 >=60 mL/min/1.73 Final     Comment:     Chronic Kidney Disease: less than 60 ml/min/1.73 sq.m. Kidney Failure: less than 15 ml/min/1.73 sq.m. Results valid for patients 18 years and older. GFR    Date Value Ref Range Status   11/09/2020 >60  Final   11/08/2020 >60  Final   11/07/2020 >60  Final     Magnesium   Date Value Ref Range Status   11/06/2020 2.5 1.6 - 2.6 mg/dL Final   11/05/2020 2.4 1.6 - 2.6 mg/dL Final     Phosphorus   Date Value Ref Range Status   11/06/2020 1.9 (L) 2.5 - 4.5 mg/dL Final     No results for input(s): PH, PO2, PCO2, HCO3, BE, O2SAT in the last 72 hours. RADIOLOGY:  CTA PULMONARY W CONTRAST   Final Result   1. No pulmonary embolism. 2.  Patchy airspace and interstitial opacities bilaterally related to   bilateral pneumonia. 3.  Perihilar lymphadenopathy notable on the right. XR CHEST PORTABLE    (Results Pending)           PROBLEM LIST:  Principal Problem:    COVID-19  Active Problems:    Hemoptysis    Dyspnea and respiratory abnormalities    COVID-19 virus infection  Resolved Problems:    * No resolved hospital problems. *      IMPRESSION:  1. COVID-19 pneumonitis  2. Hypokalemia  3.  Nausea vomiting diarrhea probably related to initial Covid infection  4. Significant weight loss over the last 2 weeks prior to admission, now stabilizing    PLAN:  1. Complete course of remdesivir  2. Complete course of dexamethasone  3. Potassium bicarbonate/citric acid  4. Chest x-ray today  5. Hopefully can be discharged following remdesivir completion  6. Transfer to [facility] when bed available  7. Patient may need vitamin D-we will check level    ATTESTATION:  ICU Staff Physician note of personal involvement in Care  As the attending physician, I certify that I personally reviewed the patients history and personally examined the patient to confirm the physical findings described above,  And that I reviewed the relevant imaging studies and available reports. I also discussed the differential diagnosis and all of the proposed management plans with the patient and individuals accompanying the patient to this visit. They had the opportunity to ask questions about the proposed management plans and to have those questions answered. This patient has a high probability of sudden, clinically significant deterioration, which requires the highest level of physician preparedness to intervene urgently. I managed/supervised life or organ supporting interventions that required frequent physician assessment. I devoted my full attention to the direct care of this patient for the amount of time indicated below. Time I spent with the family or surrogate(s) is included only if the patient was incapable of providing the necessary information or participating in medical decisions - Time devoted to teaching and to any procedures I billed separately is not included.     CRITICAL CARE TIME:  33 minutes    Electronically signed by Nancy Collins MD on 11/9/2020 at 9:33 AM

## 2020-11-10 VITALS
BODY MASS INDEX: 26.37 KG/M2 | DIASTOLIC BLOOD PRESSURE: 85 MMHG | HEART RATE: 61 BPM | WEIGHT: 174 LBS | HEIGHT: 68 IN | TEMPERATURE: 98.2 F | SYSTOLIC BLOOD PRESSURE: 142 MMHG | RESPIRATION RATE: 18 BRPM | OXYGEN SATURATION: 94 %

## 2020-11-10 LAB
ALBUMIN SERPL-MCNC: 2.9 G/DL (ref 3.5–5.2)
ALP BLD-CCNC: 39 U/L (ref 40–129)
ALT SERPL-CCNC: 58 U/L (ref 0–40)
ANION GAP SERPL CALCULATED.3IONS-SCNC: 10 MMOL/L (ref 7–16)
APTT: 25.7 SEC (ref 24.5–35.1)
AST SERPL-CCNC: 26 U/L (ref 0–39)
BASOPHILS ABSOLUTE: 0 E9/L (ref 0–0.2)
BASOPHILS RELATIVE PERCENT: 0 % (ref 0–2)
BILIRUB SERPL-MCNC: 0.5 MG/DL (ref 0–1.2)
BUN BLDV-MCNC: 15 MG/DL (ref 6–20)
CALCIUM SERPL-MCNC: 8.5 MG/DL (ref 8.6–10.2)
CHLORIDE BLD-SCNC: 102 MMOL/L (ref 98–107)
CHOLESTEROL, TOTAL: 77 MG/DL (ref 0–199)
CO2: 24 MMOL/L (ref 22–29)
CREAT SERPL-MCNC: 0.9 MG/DL (ref 0.7–1.2)
D DIMER: 798 NG/ML DDU
EOSINOPHILS ABSOLUTE: 0.08 E9/L (ref 0.05–0.5)
EOSINOPHILS RELATIVE PERCENT: 1 % (ref 0–6)
FIBRINOGEN: 623 MG/DL (ref 225–540)
GFR AFRICAN AMERICAN: >60
GFR NON-AFRICAN AMERICAN: >60 ML/MIN/1.73
GLUCOSE BLD-MCNC: 108 MG/DL (ref 74–99)
HCT VFR BLD CALC: 41 % (ref 37–54)
HDLC SERPL-MCNC: 24 MG/DL
HEMOGLOBIN: 15 G/DL (ref 12.5–16.5)
LDL CHOLESTEROL CALCULATED: 33 MG/DL (ref 0–99)
LYMPHOCYTES ABSOLUTE: 1.46 E9/L (ref 1.5–4)
LYMPHOCYTES RELATIVE PERCENT: 19 % (ref 20–42)
MCH RBC QN AUTO: 29.3 PG (ref 26–35)
MCHC RBC AUTO-ENTMCNC: 36.6 % (ref 32–34.5)
MCV RBC AUTO: 80.1 FL (ref 80–99.9)
MONOCYTES ABSOLUTE: 0.54 E9/L (ref 0.1–0.95)
MONOCYTES RELATIVE PERCENT: 7 % (ref 2–12)
NEUTROPHILS ABSOLUTE: 5.62 E9/L (ref 1.8–7.3)
NEUTROPHILS RELATIVE PERCENT: 73 % (ref 43–80)
PDW BLD-RTO: 11.8 FL (ref 11.5–15)
PLATELET # BLD: 418 E9/L (ref 130–450)
PMV BLD AUTO: 9 FL (ref 7–12)
POTASSIUM SERPL-SCNC: 3.7 MMOL/L (ref 3.5–5)
PROCALCITONIN: 0.05 NG/ML (ref 0–0.08)
RBC # BLD: 5.12 E12/L (ref 3.8–5.8)
SODIUM BLD-SCNC: 136 MMOL/L (ref 132–146)
TOTAL PROTEIN: 6.8 G/DL (ref 6.4–8.3)
TRIGL SERPL-MCNC: 98 MG/DL (ref 0–149)
VLDLC SERPL CALC-MCNC: 20 MG/DL
WBC # BLD: 7.7 E9/L (ref 4.5–11.5)

## 2020-11-10 PROCEDURE — 85378 FIBRIN DEGRADE SEMIQUANT: CPT

## 2020-11-10 PROCEDURE — 36415 COLL VENOUS BLD VENIPUNCTURE: CPT

## 2020-11-10 PROCEDURE — G0008 ADMIN INFLUENZA VIRUS VAC: HCPCS | Performed by: INTERNAL MEDICINE

## 2020-11-10 PROCEDURE — 80061 LIPID PANEL: CPT

## 2020-11-10 PROCEDURE — 80053 COMPREHEN METABOLIC PANEL: CPT

## 2020-11-10 PROCEDURE — 84145 PROCALCITONIN (PCT): CPT

## 2020-11-10 PROCEDURE — 6370000000 HC RX 637 (ALT 250 FOR IP): Performed by: SPECIALIST

## 2020-11-10 PROCEDURE — 85025 COMPLETE CBC W/AUTO DIFF WBC: CPT

## 2020-11-10 PROCEDURE — 6370000000 HC RX 637 (ALT 250 FOR IP): Performed by: INTERNAL MEDICINE

## 2020-11-10 PROCEDURE — 6360000002 HC RX W HCPCS: Performed by: INTERNAL MEDICINE

## 2020-11-10 PROCEDURE — 90686 IIV4 VACC NO PRSV 0.5 ML IM: CPT | Performed by: INTERNAL MEDICINE

## 2020-11-10 PROCEDURE — 85384 FIBRINOGEN ACTIVITY: CPT

## 2020-11-10 PROCEDURE — 2580000003 HC RX 258: Performed by: INTERNAL MEDICINE

## 2020-11-10 PROCEDURE — 85730 THROMBOPLASTIN TIME PARTIAL: CPT

## 2020-11-10 PROCEDURE — 94640 AIRWAY INHALATION TREATMENT: CPT

## 2020-11-10 RX ORDER — LANOLIN ALCOHOL/MO/W.PET/CERES
100 CREAM (GRAM) TOPICAL DAILY
Qty: 30 TABLET | Refills: 3 | Status: SHIPPED | OUTPATIENT
Start: 2020-11-11

## 2020-11-10 RX ORDER — CHOLECALCIFEROL (VITAMIN D3) 50 MCG
2000 TABLET ORAL DAILY
Qty: 30 TABLET | Refills: 0 | Status: SHIPPED | OUTPATIENT
Start: 2020-11-11 | End: 2020-12-11

## 2020-11-10 RX ORDER — VITAMIN B COMPLEX
2000 TABLET ORAL DAILY
Status: DISCONTINUED | OUTPATIENT
Start: 2020-11-11 | End: 2020-11-10 | Stop reason: HOSPADM

## 2020-11-10 RX ORDER — ZINC SULFATE 50(220)MG
50 CAPSULE ORAL DAILY
Qty: 30 CAPSULE | Refills: 3 | COMMUNITY
Start: 2020-11-11

## 2020-11-10 RX ORDER — DEXAMETHASONE 6 MG/1
6 TABLET ORAL DAILY
Qty: 7 TABLET | Refills: 0 | Status: SHIPPED | OUTPATIENT
Start: 2020-11-11 | End: 2020-11-18

## 2020-11-10 RX ADMIN — ZINC SULFATE 220 MG (50 MG) CAPSULE 50 MG: CAPSULE at 10:06

## 2020-11-10 RX ADMIN — INFLUENZA A VIRUS A/VICTORIA/2454/2019 IVR-207 (H1N1) ANTIGEN (PROPIOLACTONE INACTIVATED), INFLUENZA A VIRUS A/HONG KONG/2671/2019 IVR-208 (H3N2) ANTIGEN (PROPIOLACTONE INACTIVATED), INFLUENZA B VIRUS B/VICTORIA/705/2018 BVR-11 ANTIGEN (PROPIOLACTONE INACTIVATED), INFLUENZA B VIRUS B/PHUKET/3073/2013 BVR-1B ANTIGEN (PROPIOLACTONE INACTIVATED) 0.5 ML: 15; 15; 15; 15 INJECTION, SUSPENSION INTRAMUSCULAR at 13:39

## 2020-11-10 RX ADMIN — OXYCODONE HYDROCHLORIDE AND ACETAMINOPHEN 1000 MG: 500 TABLET ORAL at 10:06

## 2020-11-10 RX ADMIN — ENOXAPARIN SODIUM 40 MG: 40 INJECTION SUBCUTANEOUS at 10:06

## 2020-11-10 RX ADMIN — Medication 10 ML: at 10:06

## 2020-11-10 RX ADMIN — DEXAMETHASONE 6 MG: 6 TABLET ORAL at 10:06

## 2020-11-10 RX ADMIN — IPRATROPIUM BROMIDE AND ALBUTEROL 1 PUFF: 20; 100 SPRAY, METERED RESPIRATORY (INHALATION) at 06:23

## 2020-11-10 RX ADMIN — Medication 100 MG: at 10:06

## 2020-11-10 RX ADMIN — Medication 5 ML: at 10:09

## 2020-11-10 RX ADMIN — SODIUM CHLORIDE: 9 INJECTION, SOLUTION INTRAVENOUS at 03:35

## 2020-11-10 RX ADMIN — IPRATROPIUM BROMIDE AND ALBUTEROL 1 PUFF: 20; 100 SPRAY, METERED RESPIRATORY (INHALATION) at 11:30

## 2020-11-10 ASSESSMENT — PAIN SCALES - GENERAL
PAINLEVEL_OUTOF10: 0
PAINLEVEL_OUTOF10: 0

## 2020-11-10 NOTE — PROGRESS NOTES
Pulmonary/Critical Care Progress Note    We are following patient for COVID-19 pneumonitis, hypokalemia, hemoptysis-cleared, recent nausea vomiting, resolved    SUBJECTIVE:  The patient had an uneventful night. He did drop briefly in terms of saturation to 88% one time but is otherwise been saturating above 90%. He is not receiving remdesivir because of a shortage and his ability to maintain saturations. His vitamin D level was markedly low. Chest x-ray done late yesterday still shows some mild patchy infiltrates. The patient continues on dexamethasone. There was some thought being given to possible discharge today but this needs to be discussed further with the infectious disease service as well as the primary care service. MEDICATIONS:   enoxaparin  40 mg Subcutaneous Daily    Vitamin D  1,000 Units Oral Daily    vitamin B-1  100 mg Oral Daily    sodium chloride flush  10 mL Intravenous 2 times per day    cefepime  2 g Intravenous Q12H    azithromycin  250 mg Intravenous Q24H    zinc sulfate  50 mg Oral Daily    vitamin C  1,000 mg Oral Daily    albuterol-ipratropium  1 puff Inhalation Q6H    dexamethasone  6 mg Oral Daily      sodium chloride Stopped (11/10/20 0600)     sodium chloride flush, acetaminophen **OR** acetaminophen, polyethylene glycol, ondansetron, sodium chloride, promethazine-codeine      REVIEW OF SYSTEMS:  Constitutional: Denies fever, weight loss, night sweats, and fatigue  Skin: Denies pigmentation, dark lesions, and rashes   HEENT: Denies hearing loss, tinnitus, ear drainage, epistaxis, sore throat, and hoarseness. Cardiovascular: Denies palpitations, chest pain, and chest pressure. Respiratory: Denies cough, dyspnea at rest, hemoptysis, apnea, and choking.   Gastrointestinal: Denies nausea, vomiting, poor appetite, diarrhea, heartburn or reflux  Genitourinary: Denies dysuria, frequency, urgency or hematuria  Musculoskeletal: Denies myalgias, muscle weakness, and bone pain  Neurological: Denies dizziness, vertigo, headache, and focal weakness  Psychological: Denies anxiety and depression  Endocrine: Denies heat intolerance and cold intolerance  Hematopoietic/Lymphatic: Denies bleeding problems and blood transfusions    OBJECTIVE:  Vitals:    11/10/20 0635   BP: (!) 142/85   Pulse: 72   Resp: 18   Temp: 98 °F (36.7 °C)   SpO2: 91%           O2 Device: None (Room air)    PHYSICAL EXAM:  Constitutional: No fever, chills, diaphoresis  Skin: No skin rash, no skin breakdown  HEENT: Unremarkable  Neck: No JVD, lymphadenopathy, thyromegaly  Cardiovascular: S1, S2 normal.  No S3, S4 murmurs rubs present  Respiratory: Minimal, if any crackles over both posterior lung fields during inspiration  Gastrointestinal: Soft, flat, nontender  Genitourinary: No CVA tenderness  Extremities: Clubbing, cyanosis, or edema  Neurological: Awake, alert, oriented x3. No evidence of focal motor or sensory deficits  Psychological: Appropriate affect.   In good spirits    LABS:  WBC   Date Value Ref Range Status   11/10/2020 7.7 4.5 - 11.5 E9/L Final   11/09/2020 6.1 4.5 - 11.5 E9/L Final   11/08/2020 5.6 4.5 - 11.5 E9/L Final     Hemoglobin   Date Value Ref Range Status   11/10/2020 15.0 12.5 - 16.5 g/dL Final   11/09/2020 13.3 12.5 - 16.5 g/dL Final   11/08/2020 13.6 12.5 - 16.5 g/dL Final     Hematocrit   Date Value Ref Range Status   11/10/2020 41.0 37.0 - 54.0 % Final   11/09/2020 38.9 37.0 - 54.0 % Final   11/08/2020 39.8 37.0 - 54.0 % Final     MCV   Date Value Ref Range Status   11/10/2020 80.1 80.0 - 99.9 fL Final   11/09/2020 84.0 80.0 - 99.9 fL Final   11/08/2020 84.1 80.0 - 99.9 fL Final     Platelets   Date Value Ref Range Status   11/10/2020 418 130 - 450 E9/L Final   11/09/2020 392 130 - 450 E9/L Final   11/08/2020 366 130 - 450 E9/L Final     Sodium   Date Value Ref Range Status   11/10/2020 136 132 - 146 mmol/L Final   11/09/2020 138 132 - 146 mmol/L Final   11/08/2020 139 132 - 146 mmol/L Final     Potassium   Date Value Ref Range Status   11/10/2020 3.7 3.5 - 5.0 mmol/L Final   11/09/2020 3.3 (L) 3.5 - 5.0 mmol/L Final   11/08/2020 3.4 (L) 3.5 - 5.0 mmol/L Final     Chloride   Date Value Ref Range Status   11/10/2020 102 98 - 107 mmol/L Final   11/09/2020 103 98 - 107 mmol/L Final   11/08/2020 106 98 - 107 mmol/L Final     CO2   Date Value Ref Range Status   11/10/2020 24 22 - 29 mmol/L Final   11/09/2020 24 22 - 29 mmol/L Final   11/08/2020 25 22 - 29 mmol/L Final     BUN   Date Value Ref Range Status   11/10/2020 15 6 - 20 mg/dL Final   11/09/2020 9 6 - 20 mg/dL Final   11/08/2020 13 6 - 20 mg/dL Final     CREATININE   Date Value Ref Range Status   11/10/2020 0.9 0.7 - 1.2 mg/dL Final   11/09/2020 0.8 0.7 - 1.2 mg/dL Final   11/08/2020 0.8 0.7 - 1.2 mg/dL Final     Glucose   Date Value Ref Range Status   11/10/2020 108 (H) 74 - 99 mg/dL Final   11/09/2020 120 (H) 74 - 99 mg/dL Final   11/08/2020 135 (H) 74 - 99 mg/dL Final     Calcium   Date Value Ref Range Status   11/10/2020 8.5 (L) 8.6 - 10.2 mg/dL Final   11/09/2020 8.1 (L) 8.6 - 10.2 mg/dL Final   11/08/2020 8.4 (L) 8.6 - 10.2 mg/dL Final     Total Protein   Date Value Ref Range Status   11/10/2020 6.8 6.4 - 8.3 g/dL Final   11/09/2020 6.6 6.4 - 8.3 g/dL Final   11/08/2020 6.7 6.4 - 8.3 g/dL Final     Alb   Date Value Ref Range Status   11/10/2020 2.9 (L) 3.5 - 5.2 g/dL Final   11/09/2020 3.0 (L) 3.5 - 5.2 g/dL Final   11/08/2020 3.1 (L) 3.5 - 5.2 g/dL Final     Total Bilirubin   Date Value Ref Range Status   11/10/2020 0.5 0.0 - 1.2 mg/dL Final   11/09/2020 0.4 0.0 - 1.2 mg/dL Final   11/08/2020 0.4 0.0 - 1.2 mg/dL Final     Alkaline Phosphatase   Date Value Ref Range Status   11/10/2020 39 (L) 40 - 129 U/L Final   11/09/2020 42 40 - 129 U/L Final   11/08/2020 35 (L) 40 - 129 U/L Final     AST   Date Value Ref Range Status   11/10/2020 26 0 - 39 U/L Final   11/09/2020 21 0 - 39 U/L Final   11/08/2020 17 0 - 39 U/L Final     ALT   Date Value Ref Range Status   11/10/2020 58 (H) 0 - 40 U/L Final   11/09/2020 45 (H) 0 - 40 U/L Final   11/08/2020 37 0 - 40 U/L Final     GFR Non-   Date Value Ref Range Status   11/10/2020 >60 >=60 mL/min/1.73 Final     Comment:     Chronic Kidney Disease: less than 60 ml/min/1.73 sq.m. Kidney Failure: less than 15 ml/min/1.73 sq.m. Results valid for patients 18 years and older. 11/09/2020 >60 >=60 mL/min/1.73 Final     Comment:     Chronic Kidney Disease: less than 60 ml/min/1.73 sq.m. Kidney Failure: less than 15 ml/min/1.73 sq.m. Results valid for patients 18 years and older. 11/08/2020 >60 >=60 mL/min/1.73 Final     Comment:     Chronic Kidney Disease: less than 60 ml/min/1.73 sq.m. Kidney Failure: less than 15 ml/min/1.73 sq.m. Results valid for patients 18 years and older. GFR    Date Value Ref Range Status   11/10/2020 >60  Final   11/09/2020 >60  Final   11/08/2020 >60  Final     Magnesium   Date Value Ref Range Status   11/06/2020 2.5 1.6 - 2.6 mg/dL Final   11/05/2020 2.4 1.6 - 2.6 mg/dL Final     Phosphorus   Date Value Ref Range Status   11/06/2020 1.9 (L) 2.5 - 4.5 mg/dL Final     No results for input(s): PH, PO2, PCO2, HCO3, BE, O2SAT in the last 72 hours. RADIOLOGY:  XR CHEST PORTABLE   Preliminary Result   1. No interval change in the patchy bilateral pulmonary infiltrates. CTA PULMONARY W CONTRAST   Final Result   1. No pulmonary embolism. 2.  Patchy airspace and interstitial opacities bilaterally related to   bilateral pneumonia. 3.  Perihilar lymphadenopathy notable on the right. PROBLEM LIST:  Principal Problem:    COVID-19  Active Problems:    Hemoptysis    Dyspnea and respiratory abnormalities    COVID-19 virus infection  Resolved Problems:    * No resolved hospital problems. *      IMPRESSION:  1. COVID-19 pneumonitis  2. Hypokalemia, resolved  3.  Weight loss likely related to his recent Covid infection  4. Vitamin D deficiency    PLAN:  1. May be able to consider discharge  2. Continue steroids  3. Will need close follow-up  4. Needs vitamin D supplements daily  5. Antibiotics per ID service    ATTESTATION:  ICU Staff Physician note of personal involvement in Care  As the attending physician, I certify that I personally reviewed the patients history and personally examined the patient to confirm the physical findings described above,  And that I reviewed the relevant imaging studies and available reports. I also discussed the differential diagnosis and all of the proposed management plans with the patient and individuals accompanying the patient to this visit. They had the opportunity to ask questions about the proposed management plans and to have those questions answered. This patient has a high probability of sudden, clinically significant deterioration, which requires the highest level of physician preparedness to intervene urgently. I managed/supervised life or organ supporting interventions that required frequent physician assessment. I devoted my full attention to the direct care of this patient for the amount of time indicated below. Time I spent with the family or surrogate(s) is included only if the patient was incapable of providing the necessary information or participating in medical decisions - Time devoted to teaching and to any procedures I billed separately is not included.     CRITICAL CARE TIME:  32 minutes    Electronically signed by Rodrigue Rolon MD on 11/10/2020 at 9:06 AM

## 2020-11-10 NOTE — DISCHARGE SUMMARY
Value Date    HDL 24 11/10/2020     Lab Results   Component Value Date    LDLCALC 33 11/10/2020     Lab Results   Component Value Date    LABA1C 5.6 11/06/2020       IMAGING:  Xr Chest Portable    Result Date: 11/9/2020  EXAMINATION: ONE XRAY VIEW OF THE CHEST 11/9/2020 12:49 pm COMPARISON: Previous CT scan of 11/05/2020 HISTORY: ORDERING SYSTEM PROVIDED HISTORY: follow up TECHNOLOGIST PROVIDED HISTORY: Reason for exam:->follow up FINDINGS: There are vague patchy bilateral pulmonary infiltrates. In comparison with the patient's previous CT scan there is no signal change. The cardiac silhouette is mildly enlarged. There is no pleural effusion. 1. No interval change in the patchy bilateral pulmonary infiltrates. Cta Pulmonary W Contrast    Result Date: 11/5/2020  EXAMINATION: CTA OF THE CHEST 11/5/2020 9:31 pm TECHNIQUE: CTA of the chest was performed after the administration of intravenous contrast.  Multiplanar reformatted images are provided for review. MIP images are provided for review. Dose modulation, iterative reconstruction, and/or weight based adjustment of the mA/kV was utilized to reduce the radiation dose to as low as reasonably achievable. COMPARISON: None. HISTORY: ORDERING SYSTEM PROVIDED HISTORY: pe/pneumonia/COVID--Waive BUN/creatinine TECHNOLOGIST PROVIDED HISTORY: Reason for exam:->pe/pneumonia/COVID--Waive BUN/creatinine FINDINGS: There are no flow filling defects within the pulmonary arteries to suggest pulmonary arterial embolism. There are patchy ground-glass and interstitial opacities scattered throughout both lungs notable toward lung periphery of lingula, right middle lobe, and right and left lower lobes. There are few prominent mediastinal lymph nodes and multiple prominent perihilar lymph nodes notable on the right as seen on axial image number 99 measuring up to 1.6 cm. The heart is normal in size. No pericardial effusion. No pneumothorax.   View of the upper abdomen shows normal bilateral adrenal glands. 1.  No pulmonary embolism. 2.  Patchy airspace and interstitial opacities bilaterally related to bilateral pneumonia. 3.  Perihilar lymphadenopathy notable on the right. HOSPITAL COURSE:   Severiano Situ did very well throughout the hospitalization. He received maximal Covid intervention including remdesivir therapy and corticosteroids. Upon discharge, he will complete a course of oral Decadron therapy as well as appropriate vitamin supplements. Inhaler therapy has been provided. Quarantining has been discussed with the patient. Otherwise, he has become more active and ambulatory. He remains overall weak and understands this may take significant time to overcome. Otherwise he has improved dramatically and is acceptable for discharge home. Appropriate short-term disability paperwork has been filled out. BRIEF PHYSICAL EXAMINATION AND LABORATORIES ON DAY OF DISCHARGE:  VITALS:  BP (!) 142/85   Pulse 72   Temp 98 °F (36.7 °C) (Temporal)   Resp 18   Ht 5' 8\" (1.727 m)   Wt 174 lb (78.9 kg)   SpO2 91%   BMI 26.46 kg/m²     HEENT:  PERRLA. EOMI. Sclera clear. Buccal mucosa moist.    Neck:  Supple. Trachea midline. No thyromegaly. No JVD. No bruits. Heart:  Rhythm regular, rate controlled. No murmurs. Lungs:  Symmetrical. Clear to auscultation bilaterally. No wheezes. No rhonchi. No rales. Abdomen: Soft. Non-tender. Non-distended. Bowel sounds positive. No organomegaly or masses. No pain on palpation    Extremities:  Peripheral pulses present. No peripheral edema. No ulcers. Neurologic:  Alert x 3. No focal deficit. Cranial nerves grossly intact. Skin:  No petechia. No hemorrhage. No wounds. DISPOSITION:  The patient's condition is good. At this time the patient is without objective evidence of an acute process requiring continuing hospitalization or inpatient management. They are stable for discharge with outpatient follow-up.     I have spoken with the patient and discussed the results of the current hospitalization, in addition to providing specific details for the plan of care and counseling regarding the diagnosis and prognosis. The plan has been discussed in detail and they are aware of the specific conditions for emergent return, as well as the importance of follow-up. Their questions are answered at this time and they are agreeable with the plan for discharge to home    DISCHARGE MEDICATIONS:    Wilfrido León   Home Medication Instructions OPI:[id number]    Printed on:11/10/20 1132   Medication Information                      albuterol sulfate HFA (PROAIR HFA) 108 (90 Base) MCG/ACT inhaler  Inhale 2 puffs into the lungs every 6 hours as needed for Wheezing             dexamethasone (DECADRON) 6 MG tablet  Take 1 tablet by mouth daily for 7 days             Pseudoeph-Doxylamine-DM-APAP (NYQUIL PO)  Take by mouth             vitamin B-1 100 MG tablet  Take 1 tablet by mouth daily             vitamin C (VITAMIN C) 1000 MG tablet  Take 1 tablet by mouth daily             Vitamin D (CHOLECALCIFEROL) 50 MCG (2000 UT) TABS tablet  Take 1 tablet by mouth daily             zinc sulfate (ZINCATE) 220 (50 Zn) MG capsule  Take 1 capsule by mouth daily                 FOLLOW UP/INSTRUCTIONS:  · This patient is instructed to follow-up with his primary care physician. · Patient is instructed to follow-up with the consults listed above as directed by them. · he is instructed to resume home medications and take new medications as indicated in the list above. · If the patient has a recurrence of symptoms, he is instructed to go to the ED. Preparing for this patient's discharge, including paperwork, orders, instructions, and meeting with patient did require > 40 minutes.     Quincy Lefort, DO     11/10/2020  11:39 AM

## 2020-11-10 NOTE — PROGRESS NOTES
CLINICAL PHARMACY NOTE: MEDS TO [address] [facility] Patient?: No  Total # of Prescriptions Filled: 1   The following medications were delivered to the patient:  · Dexamethasone 6 mg  Total # of Interventions Completed: 2  Time Spent (min): 30    Additional Documentation: Thiamin, Vitamin C and Vitamin d not covered under insurance.  Patient was told to buy otc

## 2020-11-10 NOTE — PLAN OF CARE
Problem: Falls - Risk of:  Goal: Will remain free from falls  Description: Will remain free from falls  Outcome: Met This Shift     Problem: Falls - Risk of:  Goal: Absence of physical injury  Description: Absence of physical injury  Outcome: Met This Shift     Problem: Nutrition Deficits  Goal: Optimize nutrtional status  Outcome: Met This Shift     Problem: Risk for Fluid Volume Deficit  Goal: Maintain normal heart rhythm  Outcome: Met This Shift     Problem: Risk for Fluid Volume Deficit  Goal: Maintain absence of muscle cramping  Outcome: Met This Shift     Problem: Risk for Fluid Volume Deficit  Goal: Maintain normal serum potassium, sodium, calcium, phosphorus, and pH  Outcome: Met This Shift

## 2020-11-10 NOTE — PROGRESS NOTES
[facility] PROGRESS NOTE    F/u SARS-COV-2 infection FACE TO FACE    SUBJECTIVE:  Constantin Saucedo, 38 y.o., male     Pt was discussed with care team  Pt in bed  Has no c/o       ROS:GENERAL-             GENERAL:Temperature:  Current - Temp: 98.2 °F (36.8 °C);  Max - Temp  Av °F (36.7 °C)  Min: 97.8 °F (36.6 °C)  Max: 98.2 °F (36.8 °C)  Respiratory Rate : Resp  Av.3  Min: 18  Max: 22  Pulse Range: Pulse  Av.5  Min: 48  Max: 73  Blood Pressure Range:  Systolic (46DWT), NFZ:618 , Min:137 , MAYCOL:464   ; Diastolic (60TKQ), RGR:80, Min:85, Max:99    Pulse ox Range: SpO2  Av.4 %  Min: 91 %  Max: 95 %  24hr I & O:      Intake/Output Summary (Last 24 hours) at 11/10/2020 1417  Last data filed at 2020 2258  Gross per 24 hour   Intake 240 ml   Output --   Net 240 ml       CONSTITUTIONAL:   Awake, alert  HEENT:    AT/NC  LUNGS:   Dec bs post   CARDIOVASCULAR:   S1 and S2   ABDOMEN:     Normal bowel sounds, non-distended, non-tender   EXTREMITIES:   FROM    SKIN:     NO RASH   CNS:    NAD  PSYCH:   Pleasant     MEDS:  [START ON 2020] Vitamin D (CHOLECALCIFEROL) tablet 2,000 Units, Daily  enoxaparin (LOVENOX) injection 40 mg, Daily  vitamin B-1 (THIAMINE) tablet 100 mg, Daily  sodium chloride flush 0.9 % injection 10 mL, 2 times per day  sodium chloride flush 0.9 % injection 10 mL, PRN  acetaminophen (TYLENOL) tablet 650 mg, Q6H PRN    Or  acetaminophen (TYLENOL) suppository 650 mg, Q6H PRN  polyethylene glycol (GLYCOLAX) packet 17 g, Daily PRN  ondansetron (ZOFRAN) injection 4 mg, Q6H PRN  cefepime (MAXIPIME) 2 g IVPB extended (mini-bag), Q12H  azithromycin (ZITHROMAX) 250 mg in dextrose 5 % 250 mL IVPB, Q24H  zinc sulfate (ZINCATE) capsule 50 mg, Daily  vitamin C (ASCORBIC ACID) tablet 1,000 mg, Daily  0.9 % sodium chloride infusion, Q12H  albuterol-ipratropium (COMBIVENT RESPIMAT)  MCG/ACT inhaler 1 puff, Q6H  dexamethasone (DECADRON) tablet 6 mg, Daily  0.9 % sodium chloride bolus, PRN  promethazine-codeine (PHENERGAN with CODEINE) 6.25-10 MG/5ML solution 5 mL, Q4H PRN          Data:  Lab Results   Component Value Date    COVID19 DETECTED 11/06/2020     COVID-19/SARS-COV-2 LABS  Recent Labs     11/08/20  0601 11/09/20  0640 11/10/20  0629   PROCAL  --   --  0.05   DDIMER 365 239 798   FIBRINOGEN 631* 414 623*   AST 17 21 26   ALT 37 45* 58*   TRIG  --   --  98     Lab Results   Component Value Date    CHOL 77 11/10/2020    TRIG 98 11/10/2020    HDL 24 11/10/2020    LDLCALC 33 11/10/2020    LABVLDL 20 11/10/2020     Lab Results   Component Value Date/Time    VITD25 13 (L) 11/09/2020 06:40 AM     Recent Labs     11/08/20  0601 11/09/20  0640 11/10/20  0629   WBC 5.6 6.1 7.7   HGB 13.6 13.3 15.0   HCT 39.8 38.9 41.0    392 418   MCV 84.1 84.0 80.1   MCH 28.8 28.7 29.3   MCHC 34.2 34.2 36.6*   RDW 12.0 12.0 11.8   METASPCT  --  1.0  --    LYMPHOPCT 19.1* 32.0 19.0*   MONOPCT 4.3 3.0 7.0   BASOPCT 0.4 0.0 0.0   MONOSABS 0.22 0.18 0.54   LYMPHSABS 1.06* 2.01 1.46*   EOSABS 0.00* 0.00* 0.08   BASOSABS 0.00 0.00 0.00     Recent Labs     11/08/20  0601 11/09/20  0640 11/10/20  0629    138 136   K 3.4* 3.3* 3.7    103 102   CO2 25 24 24   BUN 13 9 15   CREATININE 0.8 0.8 0.9   GFRAA >60 >60 >60   LABGLOM >60 >60 >60   GLUCOSE 135* 120* 108*   PROT 6.7 6.6 6.8   LABALBU 3.1* 3.0* 2.9*   CALCIUM 8.4* 8.1* 8.5*   BILITOT 0.4 0.4 0.5   ALKPHOS 35* 42 39*   AST 17 21 26   ALT 37 45* 58*     U/A:  No results found for: NITRITE, COLORU, PROTEINU, PHUR, LABCAST, WBCUA, RBCUA, MUCUS, TRICHOMONAS, YEAST, BACTERIA, CLARITYU, SPECGRAV, LEUKOCYTESUR, UROBILINOGEN, BILIRUBINUR, BLOODU, GLUCOSEU, AMORPHOUS     MICRO  Blood cultures   Blood Culture, Routine   Date Value Ref Range Status   11/05/2020 24 Hours no growth  Preliminary       ASSESSMENT:    Active Hospital Problems    Diagnosis Date Noted    COVID-19 [U07.1] 11/05/2020    Hemoptysis [R04.2] 11/05/2020    Dyspnea and respiratory abnormalities [R06.00, R06.89] 11/05/2020    COVID-19 virus infection [U07.1] 11/05/2020       SARS-COV-2- positive with pneumonia  biomarkers improving ion RA will stop Remdesivir   Vitamin d deficiency   · DECADRON 6MG QDAY  x10 days    · Vitamins encourage vitamin d supplelemtn  procal wnl     Can d/c  Pt had no questions or concern                DVT prophylaxis/TREATMENT    PLAN: CONTINUE CURRENT MEDICATIONS AND SUPPORTIVE CARE.       Electronically signed by Roosevelt Patricio MD on 11/9/20 at 5:31 PM EST

## 2020-11-11 ENCOUNTER — CARE COORDINATION (OUTPATIENT)
Dept: CASE MANAGEMENT | Age: 35
End: 2020-11-11

## 2020-11-11 LAB
BLOOD CULTURE, ROUTINE: NORMAL
CULTURE, BLOOD 2: NORMAL
Lab: NORMAL
REPORT: NORMAL
THIS TEST SENT TO: NORMAL

## 2020-11-11 NOTE — CARE COORDINATION
[facility] Transitions Initial Follow Up Call    Call within 2 business days of discharge: Yes    Patient: Luis Ibarra Patient : 1985   MRN: [id number]  Reason for Admission: COVID-19  Discharge Date: 11/10/20 RARS: Readmission Risk Score: 12      Last Discharge [facility]       Complaint Diagnosis Description Type Department Provider    20 Concern For COVID-19 Hemoptysis . .. ED to Hosp-Admission (Discharged) (ADMITTED) [facility] Kendra Carrillo DO; Alison Ochoa. .. Challenges to be reviewed by the provider   Additional needs identified to be addressed with provider No  none    Discussed COVID-19 related testing which was not done at this time. Test results were not done. Patient informed of results, if available? No COVID-19 testing done with recent admission but tested positive on 20. Method of communication with provider : none    Advance Care Planning:   Does patient have an Advance Directive:  decision maker updated. Was this a readmission? No  Patient stated reason for admission: worsening shortness breath, cough and throwing up blood  Patients top risk factors for readmission: medical condition    Care Transition Nurse (CTN) contacted the patient by telephone to perform post hospital discharge assessment. Verified name and  with patient as identifiers. Provided introduction to self, and explanation of the CTN role. CTN reviewed discharge instructions, medical action plan and red flags with patient who verbalized understanding. Patient given an opportunity to ask questions and does not have any further questions or concerns at this time. Were discharge instructions available to patient? Yes. Reviewed appropriate site of care based on symptoms and resources available to patient including: PCP, Urgent care clinics and When to call 911. The patient agrees to contact the PCP office for questions related to their healthcare.      Medication reconciliation was performed with patient, who verbalizes understanding of administration of home medications. Advised obtaining a 90-day supply of all daily and as-needed medications. Covid Risk Education    Patient has following risk factors of: no known risk factors. Education provided regarding infection prevention, and signs and symptoms of COVID-19 and when to seek medical attention with patient who verbalized understanding. Discussed exposure protocols and quarantine From CDC: Are you at higher risk for severe illness?   and given an opportunity for questions and concerns. The patient agrees to contact the COVID-19 hotline [phone] or PCP office for questions related to COVID-19. For more information on steps you can take to protect yourself, see CDC's How to Protect Yourself     Patient/family/caregiver given information for [employer] and agrees to enroll yes  Patient's preferred e-mail: [email or web address]  Patient's preferred phone number: [phone]    Discussed follow-up appointments. If no appointment was previously scheduled, appointment scheduling offered: No. Is follow up appointment scheduled within 7 days of discharge? Yes  Non-[facility] follow up appointment(s): Patient states he is scheduled to establish with new PCP (Dr. Esme Torres, [address],C) on 11/17/20    Non-face-to-face services provided:  Scheduled appointment with PCP-CTN confirmed with patient he is scheduled to establish with new PCP (Dr. Esme Torres, ) on 11/17/20  Obtained and reviewed discharge summary and/or continuity of care documents  Education of patient/family/caregiver/guardian to support self-management-CTN discussed COVID-19 precautions and knowing when to seek medical attention.      Care Transitions 24 Hour Call    Do you have any ongoing symptoms?:  No  Do you have a copy of your discharge instructions?:  Yes  Do you have all of your prescriptions and are they filled?:  Yes  Have you been contacted by a [facility]

## 2021-03-18 ENCOUNTER — HOSPITAL ENCOUNTER (OUTPATIENT)
Age: 36
Discharge: HOME | End: 2021-03-20

## 2021-03-18 PROCEDURE — 88302 TISSUE EXAM BY PATHOLOGIST: CPT

## 2024-01-24 NOTE — PROGRESS NOTES
Discharge paperwork reviewed with pt and wife. Instructed pt to follow up with PCP. All questions answered. Pt discharged with all clothing, glasses, and bag.    Spoke with [facility] about pt BS and he is okay to go and follow up with PCP.    IV and heart monitor removed.    Lauren Frommelt, RN     Internal Medicine Progress Note    [facility]    [facility]. Karthik Waller., HUSSEIN.A.CKellyOKellyIKelly Oliva D.O., TREVONOKellyIRAKEL Vegas, MSN, APRN, NP-C  Rubia Rowan. Elliot Nguyen, MSN, APRN-CNP     Primary Care Physician: Uma Bowles MD   Admitting Physician:  Kenisha Rodarte DO  Admission date and time: 11/5/2020  8:52 PM    Room:  [place] [address]  Admitting diagnosis: COVID-19 virus infection [U07.1]  COVID-19 virus infection [U07.1]    Patient Name: Melina Angel  MRN: [id number]    Date of Service: 11/8/2020     Subjective:  Ted Arreguin is a 38 y.o. male who was seen and examined today,11/8/2020, at the bedside. Ted Arreguin continues to improve on a daily basis. He is not requiring nasal cannula oxygen and is tolerating current maximal Covid treatment. He has been ambulatory without difficulty with very minimal shortness of breath. He is acceptable for transfer from the intensive care unit. To prevent transmission of COVID-19 and also the need to preserve PPE,  a HPI/ROS/PE with the patient was not performed. Pt was seen in icu   Pts chart was reviewed including laboratory results and  imaging. Care will be coordinated with the ID/icu physician/nurse. Physical Exam:  No intake/output data recorded. Intake/Output Summary (Last 24 hours) at 11/8/2020 1045  Last data filed at 11/8/2020 0629  Gross per 24 hour   Intake 2562 ml   Output --   Net 2562 ml   I/O last 3 completed shifts: In: 9003 [I.V.:2212; IV Piggyback:350]  Out: -   Patient Vitals for the past 96 hrs (Last 3 readings):   Weight   11/06/20 0026 174 lb (78.9 kg)   11/05/20 2101 185 lb (83.9 kg)     Vital Signs:   Blood pressure (!) 155/99, pulse 62, temperature 98.6 °F (37 °C), temperature source Infrared, resp. rate 18, height 5' 8\" (1.727 m), weight 174 lb (78.9 kg), SpO2 92 %.     Medication:  Scheduled Meds:   sodium chloride flush  10 mL Intravenous 2 times per day    discussion with those consultants involved in the patient's care. More than 50% of my  time was spent at the bedside counseling/coordinating care with the patient and/or family with face to face contact. This time was spent reviewing notes and laboratory data as well as instructing and counseling the patient. Time I spent with the family or surrogate(s) is included only if the patient was incapable of providing the necessary information or participating in medical decisions. I also discussed the differential diagnosis and all of the proposed management plans with the patient and individuals accompanying the patient. Nabeel Ribeiro requires this high level of physician care and nursing on the [facility]/Telemetry unit due the complexity of decision management and chance of rapid decline or death. Continued cardiac monitoring and higher level of nursing are required. I am readily available for any further decision-making and intervention.      Pro Zuniga DO, F.A.C.O.I.  11/8/2020  10:45 AM